# Patient Record
Sex: FEMALE | Race: OTHER | Employment: FULL TIME | URBAN - METROPOLITAN AREA
[De-identification: names, ages, dates, MRNs, and addresses within clinical notes are randomized per-mention and may not be internally consistent; named-entity substitution may affect disease eponyms.]

---

## 2017-05-12 ENCOUNTER — HOSPITAL ENCOUNTER (OUTPATIENT)
Dept: CT IMAGING | Facility: HOSPITAL | Age: 32
Discharge: HOME | End: 2017-05-12
Attending: INTERNAL MEDICINE

## 2017-05-12 DIAGNOSIS — Z13.89 ENCOUNTER FOR SCREENING FOR OTHER DISORDER: ICD-10-CM

## 2017-06-20 ENCOUNTER — TELEPHONE (OUTPATIENT)
Dept: FAMILY MEDICINE CLINIC | Facility: CLINIC | Age: 32
End: 2017-06-20

## 2017-06-20 NOTE — TELEPHONE ENCOUNTER
Called patient and spoke with her. Advised her of recent ultra fast heart scan results. Patient states understanding. Patient states that she also needs a physcial form completed for her work.   Advised her that we would have to see her for the form to b

## 2017-10-10 PROBLEM — Z20.6 HIV EXPOSURE: Status: ACTIVE | Noted: 2017-10-10

## 2017-10-12 ENCOUNTER — LAB ENCOUNTER (OUTPATIENT)
Dept: LAB | Facility: HOSPITAL | Age: 32
End: 2017-10-12
Attending: INTERNAL MEDICINE
Payer: COMMERCIAL

## 2017-10-12 DIAGNOSIS — Z20.6 HIV EXPOSURE: ICD-10-CM

## 2017-10-12 PROCEDURE — 36415 COLL VENOUS BLD VENIPUNCTURE: CPT

## 2017-10-12 PROCEDURE — 85025 COMPLETE CBC W/AUTO DIFF WBC: CPT

## 2017-10-12 PROCEDURE — 80053 COMPREHEN METABOLIC PANEL: CPT

## 2017-10-12 PROCEDURE — 87389 HIV-1 AG W/HIV-1&-2 AB AG IA: CPT

## 2018-03-26 PROBLEM — M84.362A STRESS FRACTURE OF LEFT TIBIA, INITIAL ENCOUNTER: Status: ACTIVE | Noted: 2018-03-26

## 2018-03-28 ENCOUNTER — HOSPITAL ENCOUNTER (OUTPATIENT)
Dept: MRI IMAGING | Facility: HOSPITAL | Age: 33
Discharge: HOME | End: 2018-03-28
Attending: ORTHOPAEDIC SURGERY
Payer: COMMERCIAL

## 2018-03-28 DIAGNOSIS — M79.662 PAIN IN LEFT SHIN: ICD-10-CM

## 2018-03-28 DIAGNOSIS — M84.362A STRESS FRACTURE OF LEFT TIBIA, INITIAL ENCOUNTER: ICD-10-CM

## 2018-03-28 PROCEDURE — 73721 MRI JNT OF LWR EXTRE W/O DYE: CPT | Performed by: ORTHOPAEDIC SURGERY

## 2018-06-09 ENCOUNTER — OFFICE VISIT (OUTPATIENT)
Dept: FAMILY MEDICINE CLINIC | Facility: CLINIC | Age: 33
End: 2018-06-09

## 2018-06-09 VITALS
HEART RATE: 87 BPM | HEIGHT: 63 IN | WEIGHT: 150 LBS | RESPIRATION RATE: 16 BRPM | OXYGEN SATURATION: 98 % | BODY MASS INDEX: 26.58 KG/M2 | SYSTOLIC BLOOD PRESSURE: 110 MMHG | DIASTOLIC BLOOD PRESSURE: 60 MMHG | TEMPERATURE: 98 F

## 2018-06-09 DIAGNOSIS — J40 BRONCHITIS: ICD-10-CM

## 2018-06-09 DIAGNOSIS — J01.40 ACUTE NON-RECURRENT PANSINUSITIS: Primary | ICD-10-CM

## 2018-06-09 PROCEDURE — 99203 OFFICE O/P NEW LOW 30 MIN: CPT | Performed by: NURSE PRACTITIONER

## 2018-06-09 RX ORDER — AMOXICILLIN AND CLAVULANATE POTASSIUM 875; 125 MG/1; MG/1
1 TABLET, FILM COATED ORAL 2 TIMES DAILY
Qty: 20 TABLET | Refills: 0 | Status: SHIPPED | OUTPATIENT
Start: 2018-06-09 | End: 2018-06-19

## 2018-06-09 RX ORDER — BENZONATATE 200 MG/1
200 CAPSULE ORAL 3 TIMES DAILY PRN
Qty: 30 CAPSULE | Refills: 0 | Status: SHIPPED | OUTPATIENT
Start: 2018-06-09 | End: 2021-03-02 | Stop reason: ALTCHOICE

## 2018-06-09 RX ORDER — ALBUTEROL SULFATE 90 UG/1
2 AEROSOL, METERED RESPIRATORY (INHALATION) EVERY 6 HOURS PRN
Qty: 1 INHALER | Refills: 0 | Status: SHIPPED | OUTPATIENT
Start: 2018-06-09 | End: 2021-03-02 | Stop reason: ALTCHOICE

## 2018-06-09 NOTE — PROGRESS NOTES
CHIEF COMPLAINT:   Patient presents with:  Cough: x11 days,   Nasal Congestion      HPI:   Heron Caldera is a 29year old female who presents for cold symptoms for  11  days.  Symptoms have progressed into sinus congestion and been worsening since on GENERAL:  reports  diminished appetite  SKIN: no rashes or abnormal skin lesions  HEENT: See HPI.     LUNGS: denies shortness of breath or wheezing, See HPI  CARDIOVASCULAR: denies chest pain or palpitations   GI: denies N/V/C or abdominal pain  NEURO: + si Sig: Take 1 capsule (200 mg total) by mouth 3 (three) times daily as needed for cough. Amoxicillin-Pot Clavulanate 875-125 MG Oral Tab 20 tablet 0      Sig: Take 1 tablet by mouth 2 (two) times daily.            Risks, benefits, side effects of medi · You may use over-the-counter medicine to control fever or pain, unless another pain medicine was prescribed. If you have chronic liver or kidney disease or have ever had a stomach ulcer or gastrointestinal bleeding, talk with your healthcare provider bef © 7931-1682 [address]. 140[address] [facility], [facility] [place]. All rights reserved. This information is not intended as a substitute for professional medical care. Always follow your healthcare professional's instructions.         Sinusit · Over-the-counter decongestants may be used unless a similar medicine was prescribed. Nasal sprays work the fastest. Use one that contains phenylephrine or oxymetazoline. First blow the nose gently. Then use the spray.  Do not use these medicines more ofte © 8208-4456 [employer] [address]. 1407 [facility], [address]. All rights reserved. This information is not intended as a substitute for professional medical care. Always follow your healthcare professional's instructions.         Causes People with chronic nasal problems or allergies are more likely to get acute sinusitis. Sinusitis is also more common if you have a weakened immune system, such as with HIV.  You are also more likely to get sinusitis if you have cystic fibrosis or another c Your doctor may prescribe medications to help treat your sinusitis. If you have an infection, antibiotics can help clear it up. If you are prescribed antibiotics, take all pills on schedule until they are gone, even if you feel better.  Decongestants help r

## 2018-06-09 NOTE — PATIENT INSTRUCTIONS
Viral Bronchitis (Adult)    You have a viral bronchitis. Bronchitis is inflammation and swelling of the lining of the lungs. This is often caused by an infection. Symptoms include a dry, hacking cough that is worse at night.  The cough may bring up yellow · Over-the-counter cough, cold, and sore-throat medicines will not shorten the length of the illness, but they may help to reduce symptoms. Don't use decongestants if you have high blood pressure.   Follow-up care  Follow up with your healthcare provider, o The sinuses are air-filled spaces within the bones of the face. They connect to the inside of the nose. Sinusitis is an inflammation of the tissue lining the sinus cavity. Sinus inflammation can occur during a cold.  It can also be due to allergies to polle · Do not use nasal rinses or irrigation during an acute sinus infection, unless told to by your health care provider. Rinsing may spread the infection to other sinuses.   · Use acetaminophen or ibuprofen to control pain, unless another pain medicine was pre Colds and other infections  A cold or flu may cause your sinus and nasal linings to swell. Sinus openings can become blocked. This causes mucus to back up. This backed-up mucus becomes an ideal place for bacteria to grow.  Thick, yellow, or discolored mucus Drink fluids  Drinking extra fluids helps thin your mucus. This lets it drain from your sinuses more easily. Have a glass of water every hour or two. A humidifier helps in much the same way. Fluids can also offset the drying effects of certain medicines.  I

## 2018-09-14 ENCOUNTER — OFFICE VISIT (OUTPATIENT)
Dept: FAMILY MEDICINE CLINIC | Facility: CLINIC | Age: 33
End: 2018-09-14
Payer: COMMERCIAL

## 2018-09-14 VITALS
OXYGEN SATURATION: 99 % | DIASTOLIC BLOOD PRESSURE: 68 MMHG | HEIGHT: 62 IN | BODY MASS INDEX: 27.42 KG/M2 | RESPIRATION RATE: 16 BRPM | TEMPERATURE: 99 F | SYSTOLIC BLOOD PRESSURE: 108 MMHG | HEART RATE: 100 BPM | WEIGHT: 149 LBS

## 2018-09-14 DIAGNOSIS — Z51.81 THERAPEUTIC DRUG MONITORING: Primary | ICD-10-CM

## 2018-09-14 DIAGNOSIS — Z23 NEED FOR INFLUENZA VACCINATION: ICD-10-CM

## 2018-09-14 DIAGNOSIS — B35.9 TINEA: ICD-10-CM

## 2018-09-14 DIAGNOSIS — B35.1 ONYCHOMYCOSIS: ICD-10-CM

## 2018-09-14 DIAGNOSIS — B35.1 ONYCHOMYCOSIS: Primary | ICD-10-CM

## 2018-09-14 PROCEDURE — 90471 IMMUNIZATION ADMIN: CPT | Performed by: FAMILY MEDICINE

## 2018-09-14 PROCEDURE — 99213 OFFICE O/P EST LOW 20 MIN: CPT | Performed by: FAMILY MEDICINE

## 2018-09-14 PROCEDURE — 90686 IIV4 VACC NO PRSV 0.5 ML IM: CPT | Performed by: FAMILY MEDICINE

## 2018-09-14 RX ORDER — TERBINAFINE HYDROCHLORIDE 250 MG/1
250 TABLET ORAL DAILY
Status: SHIPPED | OUTPATIENT
Start: 2018-09-14 | End: 2018-11-13

## 2018-09-14 RX ORDER — CLOTRIMAZOLE AND BETAMETHASONE DIPROPIONATE 10; .64 MG/G; MG/G
CREAM TOPICAL
Qty: 60 G | Refills: 3 | Status: SHIPPED | OUTPATIENT
Start: 2018-09-14 | End: 2021-03-02 | Stop reason: ALTCHOICE

## 2018-09-14 RX ORDER — TERBINAFINE HYDROCHLORIDE 250 MG/1
250 TABLET ORAL DAILY
Qty: 42 TABLET | Refills: 0 | Status: SHIPPED | OUTPATIENT
Start: 2018-09-14 | End: 2018-09-14

## 2018-09-14 RX ORDER — CLOTRIMAZOLE AND BETAMETHASONE DIPROPIONATE 10; .64 MG/G; MG/G
CREAM TOPICAL
Qty: 60 G | Refills: 3 | Status: SHIPPED | OUTPATIENT
Start: 2018-09-14 | End: 2018-09-14

## 2018-09-14 RX ORDER — TERBINAFINE HYDROCHLORIDE 250 MG/1
250 TABLET ORAL DAILY
Qty: 42 TABLET | Refills: 0 | Status: SHIPPED | OUTPATIENT
Start: 2018-09-14 | End: 2018-09-17

## 2018-09-14 NOTE — TELEPHONE ENCOUNTER
Patient's insurance will not cover terbinafine, please advise on alternative. Thanks. KATHLEEN left for patient advising her that doctor is out of the office. Will follow up with her on Monday regarding alternative prescription.

## 2018-09-14 NOTE — TELEPHONE ENCOUNTER
Pt is called, stating that she was seen today at 1:40 and prescribed medication. Terbinafine HCl (LAMISIL) tab 250 mg   Pt states that she is at the pharmacy at the moment and the pharmacy is telling her insurance will not cover it.  Pt said she is waiting

## 2018-09-14 NOTE — PROGRESS NOTES
[facility] Family Medicine Office Note  Chief Complaint:   Patient presents with:  Fungus Nails: x3 weeks  Other: exposed to ring worm, spot on RT hip      HPI:   This is a 29year old female coming in for  HPI  71-year-old female who presents to benzonatate 200 MG Oral Cap Take 1 capsule (200 mg total) by mouth 3 (three) times daily as needed for cough.  Disp: 30 capsule Rfl: 0   TRUVADA 200-300 MG Oral Tab  Disp:  Rfl:       Counseling given: Not Answered       REVIEW OF SYSTEMS:   Review of Syste Displacement of lumbar intervertebral disc without myelopathy     Pregnancy     Sx.7/10/15, CPT.94044, Right 5th Toe Closed Poss ORIF, w/, Global Exp.10/8/15     HIV exposure     Stress fracture of left tibia, initial encounter

## 2018-09-17 RX ORDER — FLUCONAZOLE 150 MG/1
TABLET ORAL
Qty: 12 TABLET | Refills: 0 | Status: SHIPPED | OUTPATIENT
Start: 2018-09-17 | End: 2019-03-05

## 2018-09-17 NOTE — TELEPHONE ENCOUNTER
Eric De La O MD   You 9 minutes ago (9:39 AM)      Please call in diflucan 150 mg P.O. once weekly x 3 months. Check cmp 6 weeks after starting medication.  (Routing comment)

## 2018-11-07 ENCOUNTER — LAB ENCOUNTER (OUTPATIENT)
Dept: LAB | Facility: HOSPITAL | Age: 33
End: 2018-11-07
Attending: FAMILY MEDICINE
Payer: COMMERCIAL

## 2018-11-07 DIAGNOSIS — B35.1 ONYCHOMYCOSIS: ICD-10-CM

## 2018-11-07 DIAGNOSIS — Z09 CHEMOTHERAPY FOLLOW-UP EXAMINATION: Primary | ICD-10-CM

## 2018-11-07 PROCEDURE — 80053 COMPREHEN METABOLIC PANEL: CPT

## 2018-11-07 PROCEDURE — 36415 COLL VENOUS BLD VENIPUNCTURE: CPT

## 2018-11-07 PROCEDURE — 87389 HIV-1 AG W/HIV-1&-2 AB AG IA: CPT

## 2018-12-18 DIAGNOSIS — Z20.6 HIV EXPOSURE: Primary | ICD-10-CM

## 2019-03-06 RX ORDER — FLUCONAZOLE 150 MG/1
TABLET ORAL
Qty: 12 TABLET | Refills: 0 | Status: SHIPPED | OUTPATIENT
Start: 2019-03-06 | End: 2021-03-02 | Stop reason: ALTCHOICE

## 2019-03-06 NOTE — TELEPHONE ENCOUNTER
Patients nail fungus is back and patient would like a refill .   Medication(s) to Refill:   Requested Prescriptions     Pending Prescriptions Disp Refills   • fluconazole 150 MG Oral Tab 12 tablet 0     Sig: Take once a week for 3 months         Reason for

## 2019-07-30 ENCOUNTER — HOSPITAL ENCOUNTER (OUTPATIENT)
Dept: GENERAL RADIOLOGY | Facility: HOSPITAL | Age: 34
Discharge: HOME | End: 2019-07-30
Attending: INTERNAL MEDICINE
Payer: COMMERCIAL

## 2019-07-30 DIAGNOSIS — S92.253A: ICD-10-CM

## 2019-07-30 PROCEDURE — 73630 X-RAY EXAM OF FOOT: CPT | Performed by: INTERNAL MEDICINE

## 2020-02-18 ENCOUNTER — OFFICE VISIT (OUTPATIENT)
Dept: FAMILY MEDICINE CLINIC | Facility: CLINIC | Age: 35
End: 2020-02-18
Payer: COMMERCIAL

## 2020-02-18 VITALS
OXYGEN SATURATION: 100 % | BODY MASS INDEX: 27.23 KG/M2 | WEIGHT: 148 LBS | HEIGHT: 62 IN | RESPIRATION RATE: 19 BRPM | HEART RATE: 68 BPM | TEMPERATURE: 99 F | DIASTOLIC BLOOD PRESSURE: 68 MMHG | SYSTOLIC BLOOD PRESSURE: 114 MMHG

## 2020-02-18 DIAGNOSIS — Z01.419 WELL WOMAN EXAM WITH ROUTINE GYNECOLOGICAL EXAM: Primary | ICD-10-CM

## 2020-02-18 DIAGNOSIS — Z12.4 SCREENING FOR MALIGNANT NEOPLASM OF CERVIX: ICD-10-CM

## 2020-02-18 DIAGNOSIS — Z00.00 ROUTINE GENERAL MEDICAL EXAMINATION AT A HEALTH CARE FACILITY: ICD-10-CM

## 2020-02-18 DIAGNOSIS — Z20.6 HIV EXPOSURE: ICD-10-CM

## 2020-02-18 PROCEDURE — 99395 PREV VISIT EST AGE 18-39: CPT | Performed by: FAMILY MEDICINE

## 2020-02-18 PROCEDURE — 88175 CYTOPATH C/V AUTO FLUID REDO: CPT | Performed by: FAMILY MEDICINE

## 2020-02-18 PROCEDURE — 87624 HPV HI-RISK TYP POOLED RSLT: CPT | Performed by: FAMILY MEDICINE

## 2020-02-18 RX ORDER — EMTRICITABINE AND TENOFOVIR DISOPROXIL FUMARATE 200; 300 MG/1; MG/1
1 TABLET, FILM COATED ORAL DAILY
Qty: 90 TABLET | Refills: 1 | Status: SHIPPED | OUTPATIENT
Start: 2020-02-18 | End: 2020-02-18

## 2020-02-18 RX ORDER — EMTRICITABINE AND TENOFOVIR DISOPROXIL FUMARATE 200; 300 MG/1; MG/1
1 TABLET, FILM COATED ORAL DAILY
Qty: 90 TABLET | Refills: 1 | Status: SHIPPED | OUTPATIENT
Start: 2020-02-18 | End: 2020-06-08

## 2020-02-18 NOTE — PATIENT INSTRUCTIONS
Dr. Francisco Rivera     Dermatology    [facility] [address]  [facility]    Phone: [phone]

## 2020-02-18 NOTE — PROGRESS NOTES
Wendel Harada is a 36year old female who presents for a complete physical exam.   HPI:     Patient presents with:  Physical      Patient feels well, dental visit up to date, no hearing problem. Vaccinations up to date.   , 2 babies  Perio Tobacco Use      Smoking status: Never Smoker      Smokeless tobacco: Never Used    Alcohol use: No    Drug use: No       REVIEW OF SYSTEMS:   GENERAL HEALTH: feels well, no fatigue.   SKIN: denies any unusual skin lesions or rashes  EYES: no visual complai tenderness. Rectal exam: has normal tone, no masses. Neuro: Cranial nerves II-XII normal,no focal abnormalities, and reflexes coordination and gait normal and symmetric. Sensation intact. Extremities: are symmetric with no cyanosis, clubbing, or edema.

## 2020-02-19 LAB — HPV I/H RISK 1 DNA SPEC QL NAA+PROBE: NEGATIVE

## 2020-06-05 NOTE — TELEPHONE ENCOUNTER
Medication(s) to Refill:   Requested Prescriptions     Pending Prescriptions Disp Refills   • TRUVADA 200-300 MG Oral Tab 90 tablet 1     Sig: Take 1 tablet by mouth daily.          Reason for Medication Refill being sent to Provider / Reason Protocol Faile

## 2020-06-08 RX ORDER — EMTRICITABINE AND TENOFOVIR DISOPROXIL FUMARATE 200; 300 MG/1; MG/1
1 TABLET, FILM COATED ORAL DAILY
Qty: 90 TABLET | Refills: 1 | Status: SHIPPED | OUTPATIENT
Start: 2020-06-08 | End: 2020-08-12

## 2020-08-13 RX ORDER — DEXAMETHASONE 0.75 MG/1
1 TABLET ORAL DAILY
Qty: 90 TABLET | Refills: 1 | Status: SHIPPED | OUTPATIENT
Start: 2020-08-13 | End: 2021-01-29

## 2020-12-17 ENCOUNTER — IMMUNIZATION (OUTPATIENT)
Dept: LAB | Facility: HOSPITAL | Age: 35
End: 2020-12-17
Attending: PREVENTIVE MEDICINE
Payer: COMMERCIAL

## 2020-12-17 DIAGNOSIS — Z23 NEED FOR VACCINATION: ICD-10-CM

## 2020-12-17 PROCEDURE — 0001A PFIZER-BIONTECH COVID-19 VACCINE: CPT

## 2021-01-07 ENCOUNTER — IMMUNIZATION (OUTPATIENT)
Dept: LAB | Facility: HOSPITAL | Age: 36
End: 2021-01-07
Attending: PREVENTIVE MEDICINE

## 2021-01-07 DIAGNOSIS — Z23 NEED FOR VACCINATION: ICD-10-CM

## 2021-01-07 PROCEDURE — 0002A SARSCOV2 VAC 30MCG/0.3ML IM: CPT

## 2021-01-29 RX ORDER — EMTRICITABINE AND TENOFOVIR DISOPROXIL FUMARATE 200; 300 MG/1; MG/1
1 TABLET, FILM COATED ORAL DAILY
Qty: 90 TABLET | Refills: 1 | Status: SHIPPED | OUTPATIENT
Start: 2021-01-29 | End: 2021-08-09

## 2021-03-02 ENCOUNTER — OFFICE VISIT (OUTPATIENT)
Dept: FAMILY MEDICINE CLINIC | Facility: CLINIC | Age: 36
End: 2021-03-02
Payer: COMMERCIAL

## 2021-03-02 VITALS
BODY MASS INDEX: 28.71 KG/M2 | HEIGHT: 62 IN | RESPIRATION RATE: 16 BRPM | DIASTOLIC BLOOD PRESSURE: 84 MMHG | SYSTOLIC BLOOD PRESSURE: 120 MMHG | OXYGEN SATURATION: 98 % | WEIGHT: 156 LBS | TEMPERATURE: 97 F | HEART RATE: 91 BPM

## 2021-03-02 DIAGNOSIS — Z13.21 SCREENING FOR ENDOCRINE, NUTRITIONAL, METABOLIC AND IMMUNITY DISORDER: ICD-10-CM

## 2021-03-02 DIAGNOSIS — Z01.419 WELL WOMAN EXAM WITH ROUTINE GYNECOLOGICAL EXAM: Primary | ICD-10-CM

## 2021-03-02 DIAGNOSIS — Z13.29 SCREENING FOR ENDOCRINE, NUTRITIONAL, METABOLIC AND IMMUNITY DISORDER: ICD-10-CM

## 2021-03-02 DIAGNOSIS — Z12.4 SCREENING FOR MALIGNANT NEOPLASM OF CERVIX: ICD-10-CM

## 2021-03-02 DIAGNOSIS — Z13.228 SCREENING FOR ENDOCRINE, NUTRITIONAL, METABOLIC AND IMMUNITY DISORDER: ICD-10-CM

## 2021-03-02 DIAGNOSIS — Z13.0 SCREENING FOR ENDOCRINE, NUTRITIONAL, METABOLIC AND IMMUNITY DISORDER: ICD-10-CM

## 2021-03-02 PROCEDURE — 88175 CYTOPATH C/V AUTO FLUID REDO: CPT | Performed by: FAMILY MEDICINE

## 2021-03-02 PROCEDURE — 87624 HPV HI-RISK TYP POOLED RSLT: CPT | Performed by: FAMILY MEDICINE

## 2021-03-02 PROCEDURE — 3074F SYST BP LT 130 MM HG: CPT | Performed by: FAMILY MEDICINE

## 2021-03-02 PROCEDURE — 3079F DIAST BP 80-89 MM HG: CPT | Performed by: FAMILY MEDICINE

## 2021-03-02 PROCEDURE — 99395 PREV VISIT EST AGE 18-39: CPT | Performed by: FAMILY MEDICINE

## 2021-03-02 PROCEDURE — 3008F BODY MASS INDEX DOCD: CPT | Performed by: FAMILY MEDICINE

## 2021-03-02 RX ORDER — CLOBETASOL PROPIONATE 0.46 MG/ML
1 SOLUTION TOPICAL 2 TIMES DAILY
Qty: 60 ML | Refills: 1 | Status: SHIPPED | OUTPATIENT
Start: 2021-03-02

## 2021-03-02 RX ORDER — BUSPIRONE HYDROCHLORIDE 5 MG/1
5 TABLET ORAL 3 TIMES DAILY
Qty: 270 TABLET | Refills: 3 | Status: SHIPPED | OUTPATIENT
Start: 2021-03-02 | End: 2021-08-09

## 2021-03-02 RX ORDER — BUSPIRONE HYDROCHLORIDE 5 MG/1
5 TABLET ORAL 3 TIMES DAILY
COMMUNITY
Start: 2020-12-09 | End: 2021-03-02

## 2021-03-02 NOTE — PROGRESS NOTES
Cathleen Ahumada is a 30year old female who presents for a complete physical exam.   HPI:     Patient presents with:  Physical: with pap       Patient feels well, dental visit up to date, no hearing problem. Vaccinations up to date.   , 2 ba denies chest pain, SOB, edema,orthopnea, no palpitations   GI: denies nausea, vomiting, constipation, diarrhea; no rectal bleeding; no heartburn  GENITAL/: no dysuria, urgency or frequency  MUSCULOSKELETAL: no joint complaints upper or lower extremities mood.          ASSESSMENT AND PLAN:     Maury Kam is a 30year old female who presents for a complete physical exam.Gyn exam. Pap smear done. Pt's was recommended low fat diet and aerobic exercise 30 minutes three times weekly.    Mole: referral

## 2021-03-02 NOTE — PATIENT INSTRUCTIONS
Dr. Kan Roldan [address], [address]        Dr. Pravin Ruano      Dermatology:    Phone: [phone]  Fax: 1809 [facility] 157 [facility], [address]      [address]  [facility]

## 2021-03-03 LAB — HPV I/H RISK 1 DNA SPEC QL NAA+PROBE: NEGATIVE

## 2021-03-04 ENCOUNTER — TELEPHONE (OUTPATIENT)
Dept: FAMILY MEDICINE CLINIC | Facility: CLINIC | Age: 36
End: 2021-03-04

## 2021-08-08 ENCOUNTER — PATIENT MESSAGE (OUTPATIENT)
Dept: FAMILY MEDICINE CLINIC | Facility: CLINIC | Age: 36
End: 2021-08-08

## 2021-08-09 RX ORDER — BUSPIRONE HYDROCHLORIDE 5 MG/1
5 TABLET ORAL 3 TIMES DAILY
Qty: 270 TABLET | Refills: 3 | Status: SHIPPED | OUTPATIENT
Start: 2021-08-09

## 2021-08-09 RX ORDER — LEVONORGESTREL AND ETHINYL ESTRADIOL 0.1-0.02MG
1 KIT ORAL DAILY
Qty: 3 EACH | Refills: 4 | Status: SHIPPED | OUTPATIENT
Start: 2021-08-09 | End: 2022-08-09

## 2021-08-09 NOTE — TELEPHONE ENCOUNTER
Patient requesting order for birth control. Last pap smear on 3/2/2021. Does patient need OV for order? Please advise. Thank you.

## 2021-08-09 NOTE — TELEPHONE ENCOUNTER
Medication(s) to Refill:   Requested Prescriptions     Pending Prescriptions Disp Refills   • busPIRone 5 MG Oral Tab 270 tablet 3     Sig: Take 1 tablet (5 mg total) by mouth 3 (three) times daily.          Reason for Medication Refill being sent to [facility]

## 2021-08-09 NOTE — TELEPHONE ENCOUNTER
From: Maury Kam  To: Quita Yip MD  Sent: 8/8/2021 4:21 PM CDT  Subject: Prescription Question    Can I please have a prescription for birth control pills? My pharmacy has changed to [facility] [phone].  I will try to update it in my chart

## 2021-08-10 RX ORDER — EMTRICITABINE AND TENOFOVIR DISOPROXIL FUMARATE 200; 300 MG/1; MG/1
1 TABLET, FILM COATED ORAL DAILY
Qty: 90 TABLET | Refills: 1 | Status: SHIPPED | OUTPATIENT
Start: 2021-08-10 | End: 2021-08-11

## 2021-08-11 ENCOUNTER — TELEPHONE (OUTPATIENT)
Dept: FAMILY MEDICINE CLINIC | Facility: CLINIC | Age: 36
End: 2021-08-11

## 2021-08-11 RX ORDER — EMTRICITABINE AND TENOFOVIR DISOPROXIL FUMARATE 200; 300 MG/1; MG/1
1 TABLET, FILM COATED ORAL DAILY
Qty: 90 TABLET | Refills: 3 | Status: SHIPPED | OUTPATIENT
Start: 2021-08-11

## 2021-08-11 NOTE — TELEPHONE ENCOUNTER
Otto Jo with [facility] called, brand name Truvada requires prior authorization, the generic is preferred and does not.

## 2021-08-11 NOTE — TELEPHONE ENCOUNTER
[facility] called that Truvada requires a PA however, generic is covered. Rx pended for generic. Please approve or deny pending Rx. Thank you!

## 2022-02-25 ENCOUNTER — PATIENT MESSAGE (OUTPATIENT)
Dept: FAMILY MEDICINE CLINIC | Facility: CLINIC | Age: 37
End: 2022-02-25

## 2022-02-25 NOTE — TELEPHONE ENCOUNTER
From: Deidre Herr  Sent: 2/25/2022 8:53 AM CST  To: [facility] Clinical Staff  Subject: Lab work    Thank you so much. One more thing for my job I need a chicken pox titer drawn. Is there any way that lab could be added? I apologize for not stating that earlier it slipped my mind.

## 2022-02-25 NOTE — TELEPHONE ENCOUNTER
Lillian Dsouza MD  [facility] Staff; Any Loya RN 28 minutes ago (11:54 AM)     LP    ok    Message text

## 2022-02-25 NOTE — TELEPHONE ENCOUNTER
Pt is coming in for annual labs & is requesting chicken pox titer order, her work is requesting she get one done. LOV 3/2/21 OK to order this?

## 2022-02-25 NOTE — TELEPHONE ENCOUNTER
From: Farnaz Mays  To: Blanca Thayer MD  Sent: 2/25/2022 7:18 AM CST  Subject: Lab work    Good morning I was going to go get the lab work that was ordered. I just wanted to verify that the lab orders were sent to [email or web address] in [place]. My insurance has changed and that is where I need to get my labs drawn now. Thank you so much.

## 2022-03-01 LAB
ABSOLUTE EOSINOPHILS: 48 CELLS/UL (ref 15–500)
ABSOLUTE LYMPHOCYTES: 1643 CELLS/UL (ref 850–3900)
ABSOLUTE MONOCYTES: 281 CELLS/UL (ref 200–950)
ABSOLUTE NEUTROPHILS: 3307 CELLS/UL (ref 1500–7800)
ALBUMIN/GLOBULIN RATIO: 1.8 (CALC) (ref 1–2.5)
ALKALINE PHOSPHATASE: 82 U/L (ref 31–125)
ALT: 50 U/L (ref 6–29)
AST: 21 U/L (ref 10–30)
BASOPHILS: 0.4 %
BILIRUBIN, TOTAL: 0.5 MG/DL (ref 0.2–1.2)
BUN: 13 MG/DL (ref 7–25)
CALCIUM: 9.6 MG/DL (ref 8.6–10.2)
CARBON DIOXIDE: 27 MMOL/L (ref 20–32)
CHLORIDE: 105 MMOL/L (ref 98–110)
CHOL/HDLC RATIO: 3.4 (CALC)
CHOLESTEROL, TOTAL: 182 MG/DL
CREATININE: 0.64 MG/DL (ref 0.5–1.1)
EGFR IF AFRICN AM: 134 ML/MIN/1.73M2
EGFR IF NONAFRICN AM: 116 ML/MIN/1.73M2
EOSINOPHILS: 0.9 %
GLOBULIN: 2.6 G/DL (CALC) (ref 1.9–3.7)
GLUCOSE: 87 MG/DL (ref 65–99)
HDL CHOLESTEROL: 53 MG/DL
HEMATOCRIT: 45.7 % (ref 35–45)
HEMOGLOBIN: 15 G/DL (ref 11.7–15.5)
LDL-CHOLESTEROL: 119 MG/DL (CALC)
MCH: 28.8 PG (ref 27–33)
MCHC: 32.8 G/DL (ref 32–36)
MCV: 87.7 FL (ref 80–100)
MONOCYTES: 5.3 %
MPV: 10 FL (ref 7.5–12.5)
NEUTROPHILS: 62.4 %
NON-HDL CHOLESTEROL: 129 MG/DL (CALC)
PLATELET COUNT: 306 THOUSAND/UL (ref 140–400)
PROTEIN, TOTAL: 7.2 G/DL (ref 6.1–8.1)
RDW: 12.3 % (ref 11–15)
RED BLOOD CELL COUNT: 5.21 MILLION/UL (ref 3.8–5.1)
SODIUM: 139 MMOL/L (ref 135–146)
TRIGLYCERIDES: 35 MG/DL
TSH W/REFLEX TO FT4: 1.02 MIU/L
WHITE BLOOD CELL COUNT: 5.3 THOUSAND/UL (ref 3.8–10.8)

## 2022-03-18 ENCOUNTER — OFFICE VISIT (OUTPATIENT)
Dept: FAMILY MEDICINE CLINIC | Facility: CLINIC | Age: 37
End: 2022-03-18
Payer: COMMERCIAL

## 2022-03-18 VITALS
HEART RATE: 90 BPM | WEIGHT: 154 LBS | BODY MASS INDEX: 28.34 KG/M2 | HEIGHT: 62 IN | OXYGEN SATURATION: 98 % | DIASTOLIC BLOOD PRESSURE: 80 MMHG | SYSTOLIC BLOOD PRESSURE: 120 MMHG | RESPIRATION RATE: 16 BRPM

## 2022-03-18 DIAGNOSIS — Z12.31 VISIT FOR SCREENING MAMMOGRAM: ICD-10-CM

## 2022-03-18 DIAGNOSIS — Z12.4 SCREENING FOR MALIGNANT NEOPLASM OF CERVIX: ICD-10-CM

## 2022-03-18 DIAGNOSIS — Z01.419 WELL WOMAN EXAM WITH ROUTINE GYNECOLOGICAL EXAM: Primary | ICD-10-CM

## 2022-03-18 PROCEDURE — 3074F SYST BP LT 130 MM HG: CPT | Performed by: FAMILY MEDICINE

## 2022-03-18 PROCEDURE — 88175 CYTOPATH C/V AUTO FLUID REDO: CPT | Performed by: FAMILY MEDICINE

## 2022-03-18 PROCEDURE — 87624 HPV HI-RISK TYP POOLED RSLT: CPT | Performed by: FAMILY MEDICINE

## 2022-03-18 PROCEDURE — 3008F BODY MASS INDEX DOCD: CPT | Performed by: FAMILY MEDICINE

## 2022-03-18 PROCEDURE — 99395 PREV VISIT EST AGE 18-39: CPT | Performed by: FAMILY MEDICINE

## 2022-03-18 PROCEDURE — 3079F DIAST BP 80-89 MM HG: CPT | Performed by: FAMILY MEDICINE

## 2022-03-18 RX ORDER — EMTRICITABINE AND TENOFOVIR DISOPROXIL FUMARATE 200; 300 MG/1; MG/1
1 TABLET, FILM COATED ORAL DAILY
Qty: 90 TABLET | Refills: 3 | Status: SHIPPED | OUTPATIENT
Start: 2022-03-18

## 2022-03-18 RX ORDER — LEVONORGESTREL AND ETHINYL ESTRADIOL 0.1-0.02MG
1 KIT ORAL DAILY
Qty: 3 EACH | Refills: 4 | Status: SHIPPED | OUTPATIENT
Start: 2022-03-18 | End: 2023-03-18

## 2022-03-21 LAB — HPV I/H RISK 1 DNA SPEC QL NAA+PROBE: NEGATIVE

## 2022-03-24 ENCOUNTER — HOSPITAL ENCOUNTER (OUTPATIENT)
Dept: MAMMOGRAPHY | Facility: HOSPITAL | Age: 37
Discharge: HOME | End: 2022-03-24
Attending: FAMILY MEDICINE
Payer: COMMERCIAL

## 2022-03-24 DIAGNOSIS — Z12.31 VISIT FOR SCREENING MAMMOGRAM: ICD-10-CM

## 2022-03-24 PROCEDURE — 77067 SCR MAMMO BI INCL CAD: CPT | Performed by: FAMILY MEDICINE

## 2022-03-24 PROCEDURE — 77063 BREAST TOMOSYNTHESIS BI: CPT | Performed by: FAMILY MEDICINE

## 2022-03-25 ENCOUNTER — TELEPHONE (OUTPATIENT)
Dept: FAMILY MEDICINE CLINIC | Facility: CLINIC | Age: 37
End: 2022-03-25

## 2022-03-25 NOTE — TELEPHONE ENCOUNTER
Notified the patient of the need for additional views. Patient verbalized understanding. Provided phone number for mammography department. Answered all questions at this time. Mammogram order placed.

## 2022-03-25 NOTE — TELEPHONE ENCOUNTER
----- Message from Amie Ortega MD sent at 3/25/2022  9:41 AM CDT -----  Needs additional views of the mammogram.    [facility] SANCHEZ 2D+3D SCREENING BILAT

## 2022-03-30 ENCOUNTER — PATIENT MESSAGE (OUTPATIENT)
Dept: FAMILY MEDICINE CLINIC | Facility: CLINIC | Age: 37
End: 2022-03-30

## 2022-03-30 ENCOUNTER — TELEPHONE (OUTPATIENT)
Dept: FAMILY MEDICINE CLINIC | Facility: CLINIC | Age: 37
End: 2022-03-30

## 2022-03-30 RX ORDER — BUSPIRONE HYDROCHLORIDE 5 MG/1
5 TABLET ORAL 3 TIMES DAILY
Qty: 270 TABLET | Refills: 3 | Status: SHIPPED | OUTPATIENT
Start: 2022-03-30

## 2022-03-30 NOTE — TELEPHONE ENCOUNTER
Pt sent message stating at last ov she states she did not want to take Buspirone but now pt would like to take medication. Please approve or deny pending Rx of  busPIRone HCl 5 MG Oral Tablet (BUSPAR) TID. Thank you.    LOV: 03/18/22  LF: 08/09/21

## 2022-03-30 NOTE — TELEPHONE ENCOUNTER
From: Nelda LO  To: Obed Jackson  Sent: 3/30/2022 3:42 PM CDT  Subject: PAP and HPV results     Debbie Saba,     Dr. Dennie Ratel has reviewed your PAP and HPV results and states you had normal results. If you have any questions please contact the office. Thank you and have a good day.

## 2022-04-01 ENCOUNTER — PATIENT MESSAGE (OUTPATIENT)
Dept: FAMILY MEDICINE CLINIC | Facility: CLINIC | Age: 37
End: 2022-04-01

## 2022-04-08 ENCOUNTER — HOSPITAL ENCOUNTER (OUTPATIENT)
Dept: ULTRASOUND IMAGING | Age: 37
Discharge: HOME | End: 2022-04-08
Attending: FAMILY MEDICINE
Payer: COMMERCIAL

## 2022-04-08 ENCOUNTER — HOSPITAL ENCOUNTER (OUTPATIENT)
Dept: MAMMOGRAPHY | Age: 37
Discharge: HOME | End: 2022-04-08
Attending: FAMILY MEDICINE
Payer: COMMERCIAL

## 2022-04-08 DIAGNOSIS — R92.8 ABNORMAL MAMMOGRAM OF LEFT BREAST: ICD-10-CM

## 2022-04-08 PROCEDURE — 77065 DX MAMMO INCL CAD UNI: CPT | Performed by: FAMILY MEDICINE

## 2022-04-08 PROCEDURE — 76642 ULTRASOUND BREAST LIMITED: CPT | Performed by: FAMILY MEDICINE

## 2022-04-08 PROCEDURE — 77061 BREAST TOMOSYNTHESIS UNI: CPT | Performed by: FAMILY MEDICINE

## 2022-04-11 ENCOUNTER — TELEPHONE (OUTPATIENT)
Dept: FAMILY MEDICINE CLINIC | Facility: CLINIC | Age: 37
End: 2022-04-11

## 2022-04-12 ENCOUNTER — TELEPHONE (OUTPATIENT)
Dept: FAMILY MEDICINE CLINIC | Facility: CLINIC | Age: 37
End: 2022-04-12

## 2022-07-26 ENCOUNTER — TELEPHONE (OUTPATIENT)
Dept: INTERNAL MEDICINE CLINIC | Facility: HOSPITAL | Age: 37
End: 2022-07-26

## 2022-07-26 NOTE — TELEPHONE ENCOUNTER
7/26 dashboard submission. Reported an exposure by patient. Asymptomatic and boosted. No testing required at this time. Called employee, no answer.  My chart message sent

## 2022-12-30 ENCOUNTER — TELEPHONE (OUTPATIENT)
Dept: FAMILY MEDICINE CLINIC | Facility: CLINIC | Age: 37
End: 2022-12-30

## 2022-12-30 DIAGNOSIS — Z00.00 ROUTINE GENERAL MEDICAL EXAMINATION AT A HEALTH CARE FACILITY: Primary | ICD-10-CM

## 2023-01-09 ENCOUNTER — PATIENT MESSAGE (OUTPATIENT)
Dept: FAMILY MEDICINE CLINIC | Facility: CLINIC | Age: 38
End: 2023-01-09

## 2023-01-09 RX ORDER — BUSPIRONE HYDROCHLORIDE 5 MG/1
5 TABLET ORAL 3 TIMES DAILY
Qty: 270 TABLET | Refills: 0 | Status: SHIPPED | OUTPATIENT
Start: 2023-01-09

## 2023-01-09 RX ORDER — LEVONORGESTREL AND ETHINYL ESTRADIOL 0.1-0.02MG
1 KIT ORAL DAILY
Qty: 3 EACH | Refills: 4 | Status: CANCELLED | OUTPATIENT
Start: 2023-01-09 | End: 2024-01-09

## 2023-01-09 RX ORDER — LEVONORGESTREL AND ETHINYL ESTRADIOL 0.1-0.02MG
1 KIT ORAL DAILY
Qty: 84 EACH | Refills: 0 | Status: SHIPPED | OUTPATIENT
Start: 2023-01-09 | End: 2024-01-09

## 2023-01-09 RX ORDER — EMTRICITABINE AND TENOFOVIR DISOPROXIL FUMARATE 200; 300 MG/1; MG/1
1 TABLET, FILM COATED ORAL DAILY
Qty: 90 TABLET | Refills: 0 | Status: SHIPPED | OUTPATIENT
Start: 2023-01-09

## 2023-01-09 RX ORDER — BUSPIRONE HYDROCHLORIDE 5 MG/1
5 TABLET ORAL 3 TIMES DAILY
Qty: 270 TABLET | Refills: 3 | Status: CANCELLED | OUTPATIENT
Start: 2023-01-09

## 2023-01-09 RX ORDER — EMTRICITABINE AND TENOFOVIR DISOPROXIL FUMARATE 200; 300 MG/1; MG/1
1 TABLET, FILM COATED ORAL DAILY
Qty: 90 TABLET | Refills: 3 | Status: CANCELLED | OUTPATIENT
Start: 2023-01-09

## 2023-01-09 NOTE — TELEPHONE ENCOUNTER
Truvada (to [facility]), OCP & Buspar (to [facility]) refill request.  LOV 3/18/22. Last HIV test 2016. Please approve or deny pending Rx.

## 2023-01-09 NOTE — TELEPHONE ENCOUNTER
From: Jae Cote  Sent: 1/9/2023 8:30 AM CST  To: [facility] Clinical Staff  Subject: Prescriptions     [facility] specialty pharmacy fax is [phone]. (For the generic Truvada)    [facility] regular pharmacy is [phone]. (For my Buspurone and birth control. Thank you for your time.

## 2023-04-10 ENCOUNTER — OFFICE VISIT (OUTPATIENT)
Dept: FAMILY MEDICINE CLINIC | Facility: CLINIC | Age: 38
End: 2023-04-10
Payer: COMMERCIAL

## 2023-04-10 ENCOUNTER — OFFICE VISIT (OUTPATIENT)
Dept: INTERNAL MEDICINE CLINIC | Facility: CLINIC | Age: 38
End: 2023-04-10

## 2023-04-10 ENCOUNTER — LAB ENCOUNTER (OUTPATIENT)
Dept: LAB | Age: 38
End: 2023-04-10
Attending: INTERNAL MEDICINE
Payer: COMMERCIAL

## 2023-04-10 VITALS
WEIGHT: 164 LBS | RESPIRATION RATE: 16 BRPM | BODY MASS INDEX: 30.18 KG/M2 | OXYGEN SATURATION: 98 % | HEART RATE: 88 BPM | SYSTOLIC BLOOD PRESSURE: 110 MMHG | HEIGHT: 62 IN | DIASTOLIC BLOOD PRESSURE: 84 MMHG

## 2023-04-10 VITALS
HEART RATE: 92 BPM | SYSTOLIC BLOOD PRESSURE: 114 MMHG | HEIGHT: 62.3 IN | DIASTOLIC BLOOD PRESSURE: 84 MMHG | WEIGHT: 164 LBS | BODY MASS INDEX: 29.8 KG/M2

## 2023-04-10 DIAGNOSIS — Z51.81 THERAPEUTIC DRUG MONITORING: ICD-10-CM

## 2023-04-10 DIAGNOSIS — Z00.00 ROUTINE GENERAL MEDICAL EXAMINATION AT A HEALTH CARE FACILITY: Primary | ICD-10-CM

## 2023-04-10 DIAGNOSIS — E78.5 HYPERLIPIDEMIA, UNSPECIFIED HYPERLIPIDEMIA TYPE: ICD-10-CM

## 2023-04-10 DIAGNOSIS — Z13.21 ENCOUNTER FOR VITAMIN DEFICIENCY SCREENING: ICD-10-CM

## 2023-04-10 DIAGNOSIS — E66.3 OVERWEIGHT (BMI 25.0-29.9): Primary | ICD-10-CM

## 2023-04-10 DIAGNOSIS — N89.8 VAGINAL IRRITATION: ICD-10-CM

## 2023-04-10 LAB
APPEARANCE: CLEAR
BILIRUBIN: NEGATIVE
GLUCOSE (URINE DIPSTICK): NEGATIVE MG/DL
KETONES (URINE DIPSTICK): NEGATIVE MG/DL
MULTISTIX LOT#: ABNORMAL NUMERIC
NITRITE, URINE: NEGATIVE
OCCULT BLOOD: NEGATIVE
PH, URINE: 5.5 (ref 4.5–8)
PROTEIN (URINE DIPSTICK): NEGATIVE MG/DL
SPECIFIC GRAVITY: 1.02 (ref 1–1.03)
URINE-COLOR: YELLOW
UROBILINOGEN,SEMI-QN: 0.2 MG/DL (ref 0–1.9)

## 2023-04-10 PROCEDURE — 93010 ELECTROCARDIOGRAM REPORT: CPT | Performed by: INTERNAL MEDICINE

## 2023-04-10 PROCEDURE — 93005 ELECTROCARDIOGRAM TRACING: CPT

## 2023-04-10 PROCEDURE — 87086 URINE CULTURE/COLONY COUNT: CPT | Performed by: NURSE PRACTITIONER

## 2023-04-10 RX ORDER — NYSTATIN 100000 U/G
1 CREAM TOPICAL 2 TIMES DAILY
Qty: 30 G | Refills: 1 | Status: SHIPPED | OUTPATIENT
Start: 2023-04-10 | End: 2023-04-10

## 2023-04-10 RX ORDER — METOPROLOL SUCCINATE 25 MG/1
TABLET, EXTENDED RELEASE ORAL
COMMUNITY
Start: 2023-01-09

## 2023-04-10 RX ORDER — NYSTATIN 100000 U/G
1 CREAM TOPICAL 2 TIMES DAILY
Qty: 30 G | Refills: 1 | Status: SHIPPED | OUTPATIENT
Start: 2023-04-10 | End: 2023-04-20

## 2023-04-10 RX ORDER — LEVONORGESTREL AND ETHINYL ESTRADIOL 0.1-0.02MG
1 KIT ORAL DAILY
Qty: 84 EACH | Refills: 3 | Status: SHIPPED | OUTPATIENT
Start: 2023-04-10 | End: 2024-04-09

## 2023-04-10 RX ORDER — EMTRICITABINE AND TENOFOVIR DISOPROXIL FUMARATE 200; 300 MG/1; MG/1
1 TABLET, FILM COATED ORAL DAILY
Qty: 90 TABLET | Refills: 1 | Status: SHIPPED | OUTPATIENT
Start: 2023-04-10

## 2023-04-10 RX ORDER — PHENTERMINE HYDROCHLORIDE 15 MG/1
15 CAPSULE ORAL EVERY MORNING
Qty: 30 CAPSULE | Refills: 0 | Status: SHIPPED | OUTPATIENT
Start: 2023-04-10

## 2023-04-11 LAB
ATRIAL RATE: 66 BPM
P AXIS: 37 DEGREES
P-R INTERVAL: 132 MS
Q-T INTERVAL: 400 MS
QRS DURATION: 90 MS
QTC CALCULATION (BEZET): 419 MS
R AXIS: 3 DEGREES
T AXIS: 26 DEGREES
VENTRICULAR RATE: 66 BPM

## 2023-04-16 ENCOUNTER — PATIENT MESSAGE (OUTPATIENT)
Dept: INTERNAL MEDICINE CLINIC | Facility: CLINIC | Age: 38
End: 2023-04-16

## 2023-04-19 NOTE — TELEPHONE ENCOUNTER
Dr. Sophie Yan to review and advise -- see letter in MyChart message and notes below    Also routing to Marva Spaulding PA-C as Dr. Sophie Yan is out of the office

## 2023-04-24 NOTE — TELEPHONE ENCOUNTER
Pt calling back and states was able to find Goodrx coupon for Phentermine and requests sent to [facility] in [place]. RN not authorized to send prescription. Pended for provider review.

## 2023-04-25 RX ORDER — PHENTERMINE HYDROCHLORIDE 15 MG/1
15 CAPSULE ORAL EVERY MORNING
Qty: 30 CAPSULE | Refills: 0 | Status: SHIPPED | OUTPATIENT
Start: 2023-04-25

## 2023-05-15 ENCOUNTER — OFFICE VISIT (OUTPATIENT)
Dept: INTERNAL MEDICINE CLINIC | Facility: CLINIC | Age: 38
End: 2023-05-15

## 2023-05-15 VITALS
SYSTOLIC BLOOD PRESSURE: 114 MMHG | WEIGHT: 156 LBS | OXYGEN SATURATION: 100 % | BODY MASS INDEX: 28.34 KG/M2 | HEART RATE: 98 BPM | HEIGHT: 62.3 IN | DIASTOLIC BLOOD PRESSURE: 92 MMHG

## 2023-05-15 DIAGNOSIS — E66.3 OVERWEIGHT (BMI 25.0-29.9): Primary | ICD-10-CM

## 2023-05-15 DIAGNOSIS — Z51.81 THERAPEUTIC DRUG MONITORING: ICD-10-CM

## 2023-05-15 DIAGNOSIS — E78.5 HYPERLIPIDEMIA, UNSPECIFIED HYPERLIPIDEMIA TYPE: ICD-10-CM

## 2023-05-15 DIAGNOSIS — R03.0 ELEVATED BP WITHOUT DIAGNOSIS OF HYPERTENSION: ICD-10-CM

## 2023-05-15 PROCEDURE — 3008F BODY MASS INDEX DOCD: CPT | Performed by: INTERNAL MEDICINE

## 2023-05-15 PROCEDURE — 3074F SYST BP LT 130 MM HG: CPT | Performed by: INTERNAL MEDICINE

## 2023-05-15 PROCEDURE — 3080F DIAST BP >= 90 MM HG: CPT | Performed by: INTERNAL MEDICINE

## 2023-05-15 PROCEDURE — 99214 OFFICE O/P EST MOD 30 MIN: CPT | Performed by: INTERNAL MEDICINE

## 2023-05-15 RX ORDER — TOPIRAMATE 25 MG/1
25 TABLET ORAL 2 TIMES DAILY
Qty: 60 TABLET | Refills: 0 | Status: SHIPPED | OUTPATIENT
Start: 2023-05-15 | End: 2023-06-14

## 2023-05-15 RX ORDER — PHENTERMINE HYDROCHLORIDE 15 MG/1
15 CAPSULE ORAL EVERY MORNING
Qty: 30 CAPSULE | Refills: 0 | Status: CANCELLED | OUTPATIENT
Start: 2023-05-15

## 2023-06-19 ENCOUNTER — OFFICE VISIT (OUTPATIENT)
Dept: INTERNAL MEDICINE CLINIC | Facility: CLINIC | Age: 38
End: 2023-06-19

## 2023-06-19 VITALS
SYSTOLIC BLOOD PRESSURE: 106 MMHG | HEART RATE: 105 BPM | BODY MASS INDEX: 27.58 KG/M2 | WEIGHT: 151.81 LBS | DIASTOLIC BLOOD PRESSURE: 71 MMHG | HEIGHT: 62.3 IN

## 2023-06-19 DIAGNOSIS — R03.0 ELEVATED BP WITHOUT DIAGNOSIS OF HYPERTENSION: ICD-10-CM

## 2023-06-19 DIAGNOSIS — E66.3 OVERWEIGHT (BMI 25.0-29.9): ICD-10-CM

## 2023-06-19 DIAGNOSIS — E78.5 HYPERLIPIDEMIA, UNSPECIFIED HYPERLIPIDEMIA TYPE: Primary | ICD-10-CM

## 2023-06-19 DIAGNOSIS — Z51.81 THERAPEUTIC DRUG MONITORING: ICD-10-CM

## 2023-06-19 PROCEDURE — 3078F DIAST BP <80 MM HG: CPT | Performed by: INTERNAL MEDICINE

## 2023-06-19 PROCEDURE — 99214 OFFICE O/P EST MOD 30 MIN: CPT | Performed by: INTERNAL MEDICINE

## 2023-06-19 PROCEDURE — 3008F BODY MASS INDEX DOCD: CPT | Performed by: INTERNAL MEDICINE

## 2023-06-19 PROCEDURE — 3074F SYST BP LT 130 MM HG: CPT | Performed by: INTERNAL MEDICINE

## 2023-06-19 RX ORDER — TOPIRAMATE 25 MG/1
25 TABLET ORAL 2 TIMES DAILY
Qty: 60 TABLET | Refills: 1 | Status: SHIPPED | OUTPATIENT
Start: 2023-06-19 | End: 2023-08-18

## 2023-06-19 RX ORDER — TOPIRAMATE 25 MG/1
25 TABLET ORAL 2 TIMES DAILY
COMMUNITY
End: 2023-06-19

## 2023-08-26 LAB
ABSOLUTE BASOPHILS: 22 CELLS/UL (ref 0–200)
ABSOLUTE EOSINOPHILS: 43 CELLS/UL (ref 15–500)
ABSOLUTE LYMPHOCYTES: 1523 CELLS/UL (ref 850–3900)
ABSOLUTE MONOCYTES: 497 CELLS/UL (ref 200–950)
ABSOLUTE NEUTROPHILS: 8716 CELLS/UL (ref 1500–7800)
ALBUMIN/GLOBULIN RATIO: 1.9 (CALC) (ref 1–2.5)
ALBUMIN: 4.9 G/DL (ref 3.6–5.1)
ALKALINE PHOSPHATASE: 65 U/L (ref 31–125)
ALT: 16 U/L (ref 6–29)
AST: 15 U/L (ref 10–30)
BASOPHILS: 0.2 %
BILIRUBIN, TOTAL: 0.5 MG/DL (ref 0.2–1.2)
BUN: 15 MG/DL (ref 7–25)
CALCIUM: 9.8 MG/DL (ref 8.6–10.2)
CARBON DIOXIDE: 25 MMOL/L (ref 20–32)
CHLORIDE: 105 MMOL/L (ref 98–110)
CHOL/HDLC RATIO: 3.3 (CALC)
CHOLESTEROL, TOTAL: 200 MG/DL
CREATININE: 0.85 MG/DL (ref 0.5–0.97)
EGFR: 90 ML/MIN/1.73M2
EOSINOPHILS: 0.4 %
GLOBULIN: 2.6 G/DL (CALC) (ref 1.9–3.7)
GLUCOSE: 78 MG/DL (ref 65–99)
HDL CHOLESTEROL: 61 MG/DL
HEMATOCRIT: 42.8 % (ref 35–45)
HEMOGLOBIN: 14.3 G/DL (ref 11.7–15.5)
LDL-CHOLESTEROL: 126 MG/DL (CALC)
LYMPHOCYTES: 14.1 %
MCH: 29.3 PG (ref 27–33)
MCHC: 33.4 G/DL (ref 32–36)
MCV: 87.7 FL (ref 80–100)
MONOCYTES: 4.6 %
MPV: 10.1 FL (ref 7.5–12.5)
NEUTROPHILS: 80.7 %
NON-HDL CHOLESTEROL: 139 MG/DL (CALC)
PLATELET COUNT: 328 THOUSAND/UL (ref 140–400)
POTASSIUM: 4.4 MMOL/L (ref 3.5–5.3)
PROTEIN, TOTAL: 7.5 G/DL (ref 6.1–8.1)
RDW: 12.5 % (ref 11–15)
RED BLOOD CELL COUNT: 4.88 MILLION/UL (ref 3.8–5.1)
SODIUM: 139 MMOL/L (ref 135–146)
TRIGLYCERIDES: 42 MG/DL
TSH W/REFLEX TO FT4: 1.11 MIU/L
VITAMIN D, 25-OH, TOTAL: 25 NG/ML (ref 30–100)
WHITE BLOOD CELL COUNT: 10.8 THOUSAND/UL (ref 3.8–10.8)

## 2023-08-28 RX ORDER — ERGOCALCIFEROL 1.25 MG/1
50000 CAPSULE ORAL WEEKLY
Qty: 12 CAPSULE | Refills: 0 | Status: SHIPPED | OUTPATIENT
Start: 2023-08-28 | End: 2023-11-20

## 2023-08-29 RX ORDER — DOXYCYCLINE HYCLATE 100 MG
100 TABLET ORAL 2 TIMES DAILY
COMMUNITY
Start: 2023-07-20 | End: 2023-08-31

## 2023-08-29 RX ORDER — NYSTATIN 100000 U/G
CREAM TOPICAL
COMMUNITY
Start: 2023-08-16

## 2023-08-31 ENCOUNTER — OFFICE VISIT (OUTPATIENT)
Dept: INTERNAL MEDICINE CLINIC | Facility: CLINIC | Age: 38
End: 2023-08-31

## 2023-08-31 VITALS
HEIGHT: 62.3 IN | WEIGHT: 153.63 LBS | BODY MASS INDEX: 27.92 KG/M2 | HEART RATE: 69 BPM | DIASTOLIC BLOOD PRESSURE: 79 MMHG | SYSTOLIC BLOOD PRESSURE: 112 MMHG

## 2023-08-31 DIAGNOSIS — E55.9 VITAMIN D DEFICIENCY: ICD-10-CM

## 2023-08-31 DIAGNOSIS — R03.0 ELEVATED BP WITHOUT DIAGNOSIS OF HYPERTENSION: ICD-10-CM

## 2023-08-31 DIAGNOSIS — E66.3 OVERWEIGHT (BMI 25.0-29.9): ICD-10-CM

## 2023-08-31 DIAGNOSIS — E78.2 MODERATE MIXED HYPERLIPIDEMIA NOT REQUIRING STATIN THERAPY: Primary | ICD-10-CM

## 2023-08-31 DIAGNOSIS — D72.829 LEUKOCYTOSIS, UNSPECIFIED TYPE: ICD-10-CM

## 2023-08-31 PROCEDURE — 99214 OFFICE O/P EST MOD 30 MIN: CPT | Performed by: INTERNAL MEDICINE

## 2023-08-31 PROCEDURE — 3074F SYST BP LT 130 MM HG: CPT | Performed by: INTERNAL MEDICINE

## 2023-08-31 PROCEDURE — 3078F DIAST BP <80 MM HG: CPT | Performed by: INTERNAL MEDICINE

## 2023-08-31 PROCEDURE — 3008F BODY MASS INDEX DOCD: CPT | Performed by: INTERNAL MEDICINE

## 2023-08-31 RX ORDER — DIETHYLPROPION HYDROCHLORIDE 25 MG/1
1 TABLET ORAL
Qty: 60 TABLET | Refills: 1 | Status: SHIPPED | OUTPATIENT
Start: 2023-08-31 | End: 2023-09-30

## 2023-09-18 LAB
ABSOLUTE BASOPHILS: 29 CELLS/UL (ref 0–200)
ABSOLUTE EOSINOPHILS: 58 CELLS/UL (ref 15–500)
ABSOLUTE LYMPHOCYTES: 1740 CELLS/UL (ref 850–3900)
ABSOLUTE MONOCYTES: 278 CELLS/UL (ref 200–950)
ABSOLUTE NEUTROPHILS: 3695 CELLS/UL (ref 1500–7800)
BASOPHILS: 0.5 %
EOSINOPHILS: 1 %
HEMATOCRIT: 42.9 % (ref 35–45)
HEMOGLOBIN: 14.6 G/DL (ref 11.7–15.5)
LYMPHOCYTES: 30 %
MCH: 29.5 PG (ref 27–33)
MCHC: 34 G/DL (ref 32–36)
MCV: 86.7 FL (ref 80–100)
MONOCYTES: 4.8 %
MPV: 10.1 FL (ref 7.5–12.5)
NEUTROPHILS: 63.7 %
PLATELET COUNT: 306 THOUSAND/UL (ref 140–400)
RDW: 12.7 % (ref 11–15)
RED BLOOD CELL COUNT: 4.95 MILLION/UL (ref 3.8–5.1)
WHITE BLOOD CELL COUNT: 5.8 THOUSAND/UL (ref 3.8–10.8)

## 2023-10-15 ENCOUNTER — PATIENT MESSAGE (OUTPATIENT)
Dept: FAMILY MEDICINE CLINIC | Facility: CLINIC | Age: 38
End: 2023-10-15

## 2023-10-15 DIAGNOSIS — Z01.84 IMMUNITY STATUS TESTING: Primary | ICD-10-CM

## 2023-10-16 NOTE — TELEPHONE ENCOUNTER
From: Dao Churchill  To: Harper Platt  Sent: 10/15/2023 1:30 PM CDT  Subject: Chicken pox declaration    Good afternoon, I need documentation of having chicken pox previously from my physician. 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 It states: Please provide a vaccination history that demonstrates vaccination against Varicella (Chicken Pox). For this credential, it is required that all results be clearly documented from a Medical Professional and include your full name, the Medical Professional's name, address and Examples include Medical Professional Letterhead, Prescription pads, Office/Clinician  \"stamp\", Laboratory record, etc. To satisfy this credential, we can accept any of the followin - - Clear documentation from a Medical  Professional indicating that you have a history of  Varicella (Chicken Pox). Can I please get documentation of this? I had chicken pox when I was 11years old.

## 2023-11-06 ENCOUNTER — OFFICE VISIT (OUTPATIENT)
Dept: INTERNAL MEDICINE CLINIC | Facility: CLINIC | Age: 38
End: 2023-11-06

## 2023-11-06 VITALS
BODY MASS INDEX: 28.53 KG/M2 | WEIGHT: 157 LBS | SYSTOLIC BLOOD PRESSURE: 130 MMHG | HEART RATE: 88 BPM | OXYGEN SATURATION: 100 % | HEIGHT: 62.3 IN | DIASTOLIC BLOOD PRESSURE: 80 MMHG

## 2023-11-06 DIAGNOSIS — E66.3 OVERWEIGHT (BMI 25.0-29.9): ICD-10-CM

## 2023-11-06 DIAGNOSIS — R03.0 ELEVATED BP WITHOUT DIAGNOSIS OF HYPERTENSION: ICD-10-CM

## 2023-11-06 DIAGNOSIS — E78.2 MODERATE MIXED HYPERLIPIDEMIA NOT REQUIRING STATIN THERAPY: Primary | ICD-10-CM

## 2023-11-06 PROCEDURE — 3008F BODY MASS INDEX DOCD: CPT | Performed by: INTERNAL MEDICINE

## 2023-11-06 PROCEDURE — 99214 OFFICE O/P EST MOD 30 MIN: CPT | Performed by: INTERNAL MEDICINE

## 2023-11-06 PROCEDURE — 3079F DIAST BP 80-89 MM HG: CPT | Performed by: INTERNAL MEDICINE

## 2023-11-06 PROCEDURE — 3075F SYST BP GE 130 - 139MM HG: CPT | Performed by: INTERNAL MEDICINE

## 2023-11-06 RX ORDER — DIETHYLPROPION HYDROCHLORIDE 25 MG/1
1 TABLET ORAL
Qty: 60 TABLET | Refills: 1 | Status: SHIPPED | OUTPATIENT
Start: 2023-11-06 | End: 2024-01-05

## 2023-11-06 RX ORDER — DIETHYLPROPION HYDROCHLORIDE 25 MG/1
1 TABLET ORAL
COMMUNITY
Start: 2023-10-09 | End: 2023-11-06

## 2023-11-06 RX ORDER — METFORMIN HYDROCHLORIDE 500 MG/1
1000 TABLET, EXTENDED RELEASE ORAL 2 TIMES DAILY WITH MEALS
Qty: 120 TABLET | Refills: 1 | Status: SHIPPED | OUTPATIENT
Start: 2023-11-06 | End: 2024-01-05

## 2024-01-05 RX ORDER — EMTRICITABINE AND TENOFOVIR DISOPROXIL FUMARATE 200; 300 MG/1; MG/1
1 TABLET, FILM COATED ORAL DAILY
Qty: 90 TABLET | Refills: 1 | Status: SHIPPED | OUTPATIENT
Start: 2024-01-05

## 2024-01-05 RX ORDER — LEVONORGESTREL AND ETHINYL ESTRADIOL 0.1-0.02MG
1 KIT ORAL DAILY
Qty: 84 EACH | Refills: 3 | Status: SHIPPED | OUTPATIENT
Start: 2024-01-05 | End: 2025-01-04

## 2024-01-05 NOTE — TELEPHONE ENCOUNTER
Medication(s) to Refill:   Requested Prescriptions     Pending Prescriptions Disp Refills    busPIRone 5 MG Oral Tab 270 tablet 0     Sig: Take 1 tablet (5 mg total) by mouth 3 (three) times daily.         Reason for Medication Refill being sent to Provider / Reason Protocol Failed:  [] 90 day refill has already been granted  [] Blood Pressure out of range  [] Labs Abnormal/over due  [] Medication not previously prescribed by Provider  [x] Non-Protocol Medication  [] Controlled Substance   [] Due for appointment- no future appointment scheduled  [] No Follow up specified      Last Time Medication was Filled:  1/9/2023      Last Office Visit with PCP: 4/10/2023 w/NP    When Patient was Due Back to the Office:  one year   (from when PCP last addressed condition)    Future Appointments:  Future Appointments   Date Time Provider Department Center   1/8/2024 12:00 PM [facility]   3/1/2024 12:00 PM Gunjan Collins MD [facility]         Last Blood Pressures:  BP Readings from Last 2 Encounters:   11/06/23 130/80   08/31/23 112/79       Action taken:  [] Refill approved per protocol  [x] Routing to provider for approval

## 2024-01-05 NOTE — TELEPHONE ENCOUNTER
Medication(s) to Refill:   Requested Prescriptions     Pending Prescriptions Disp Refills    Levonorgestrel-Ethinyl Estrad (AVIANE) 0.1-20 MG-MCG Oral Tab 84 each 3     Sig: Take 1 tablet by mouth daily.    emtricitabine-tenofovir (TRUVADA) 200-300 MG Oral Tab 90 tablet 1     Sig: Take 1 tablet by mouth daily.         Reason for Medication Refill being sent to Provider / Reason Protocol Failed:  [] 90 day refill has already been granted  [] Blood Pressure out of range  [] Labs Abnormal/over due  [] Medication not previously prescribed by Provider  [] Non-Protocol Medication  [] Controlled Substance   [] Due for appointment- no future appointment scheduled  [] No Follow up specified      Last Time Medication was Filled:  4/10/2023      Last Office Visit with PCP: 4/10/2023 with NP     When Patient was Due Back to the Office:  1 year   (from when PCP last addressed condition)    Future Appointments:  Future Appointments   Date Time Provider Department Center   1/8/2024 12:00 PM Erin Holder MD [facility]   3/1/2024 12:00 PM Gunjan Collins MD [facility] [place]         Last Blood Pressures:  BP Readings from Last 2 Encounters:   11/06/23 130/80   08/31/23 112/79               Action taken:  [] Refill approved per protocol  [x] Routing to provider for approval

## 2024-01-07 RX ORDER — BUSPIRONE HYDROCHLORIDE 5 MG/1
5 TABLET ORAL 3 TIMES DAILY
Qty: 270 TABLET | Refills: 0 | Status: SHIPPED | OUTPATIENT
Start: 2024-01-07

## 2024-02-08 ENCOUNTER — TELEMEDICINE (OUTPATIENT)
Dept: INTERNAL MEDICINE CLINIC | Facility: CLINIC | Age: 39
End: 2024-02-08
Payer: COMMERCIAL

## 2024-02-08 DIAGNOSIS — E66.3 OVERWEIGHT WITH BODY MASS INDEX (BMI) OF 29 TO 29.9 IN ADULT: Primary | ICD-10-CM

## 2024-02-08 DIAGNOSIS — E78.2 MODERATE MIXED HYPERLIPIDEMIA NOT REQUIRING STATIN THERAPY: ICD-10-CM

## 2024-02-08 DIAGNOSIS — I10 PRIMARY HYPERTENSION: ICD-10-CM

## 2024-02-08 PROCEDURE — 99214 OFFICE O/P EST MOD 30 MIN: CPT | Performed by: INTERNAL MEDICINE

## 2024-02-08 RX ORDER — TIRZEPATIDE 2.5 MG/.5ML
2.5 INJECTION, SOLUTION SUBCUTANEOUS WEEKLY
Qty: 6.5 ML | Refills: 0 | Status: SHIPPED | OUTPATIENT
Start: 2024-02-08 | End: 2024-02-09

## 2024-02-08 NOTE — PROGRESS NOTES
Patient ID: Tanisha Mack is a 38 year old female.  Chief Complaint   Patient presents with    Weight Loss          HISTORY OF PRESENT ILLNESS:   Patient presents for above.  This visit is conducted using Telemedicine with live, interactive video and audio.      Initial weight: 164 lbs 4/2023  Current weight: 156 lbs  Interval weight loss: 1 lbs  Total weight loss: 7 lbs    Review of Systems   Constitutional:  Negative for appetite change, fatigue and fever.   HENT:  Negative for drooling, ear pain, hearing loss and sore throat.    Eyes:  Negative for pain and redness.   Respiratory:  Negative for choking, shortness of breath, wheezing and stridor.    Cardiovascular:  Negative for chest pain and palpitations.   Gastrointestinal:  Negative for abdominal pain.   Endocrine: Negative for polydipsia and polyphagia.   Musculoskeletal:  Negative for arthralgias.   Neurological:  Negative for dizziness, speech difficulty, light-headedness and numbness.      MEDICAL HISTORY:   History reviewed. No pertinent past medical history.    Past Surgical History:   Procedure Laterality Date    ABDOMINAL SURGERY      abdominal plasty    CLOSED RX TOE FX,MANIPULATN Bilateral 07/10/2015    Procedure: OPEN REDUCTION PERCUTANEOUS PINNING TOE;  Surgeon: Yogesh Mas MD;  [facility]: [facility]    FLUOROSCOPE EXAMINATION Bilateral 07/10/2015    Procedure: OPEN REDUCTION PERCUTANEOUS PINNING TOE;  Surgeon: Yogesh Mas MD;  Location: [facility]    INJECTION, ANESTHETIC/STEROID, TRANSFORAMINAL EPIDURAL; LUMBAR/SACRAL, SINGLE LEVEL  01/01/2005    OTHER SURGICAL HISTORY  04/2022    bilater breast reduction         Current Outpatient Medications:     Tirzepatide-Weight Management (ZEPBOUND) 2.5 MG/0.5ML Subcutaneous Solution Auto-injector, Inject 2.5 mg into the skin once a week., Disp: 6.5 mL, Rfl: 0    busPIRone 5 MG Oral Tab, Take 1 tablet (5 mg total) by mouth 3 (three) times daily., Disp: 270 tablet, Rfl: 0     Levonorgestrel-Ethinyl Estrad (AVIANE) 0.1-20 MG-MCG Oral Tab, Take 1 tablet by mouth daily., Disp: 84 each, Rfl: 3    emtricitabine-tenofovir (TRUVADA) 200-300 MG Oral Tab, Take 1 tablet by mouth daily., Disp: 90 tablet, Rfl: 1    metoprolol succinate ER 25 MG Oral Tablet 24 Hr, , Disp: , Rfl:     Allergies:No Known Allergies    Social History     Socioeconomic History    Marital status:      Spouse name: Not on file    Number of children: Not on file    Years of education: Not on file    Highest education level: Not on file   Occupational History    Not on file   Tobacco Use    Smoking status: Never    Smokeless tobacco: Never   Vaping Use    Vaping Use: Never used   Substance and Sexual Activity    Alcohol use: No    Drug use: No    Sexual activity: Not on file   Other Topics Concern    Not on file   Social History Narrative    Not on file     Social Determinants of Health     Financial Resource Strain: Not on file   Food Insecurity: Not on file   Transportation Needs: Not on file   Physical Activity: Not on file   Stress: Not on file   Social Connections: Not on file   Housing Stability: Not on file       PHYSICAL EXAM:   Unable to perform vitals or do physical exam as this is a virtual video visit.  Patient appears alert.  No conversational dyspnea or distress.    ASSESSMENT/PLAN:     Nutritional Counseling: educate, track, dietician     1. Overweight with body mass index (BMI) of 29 to 29.9 in adult  2. Moderate mixed hyperlipidemia not requiring statin therapy  3. Primary hypertension     -Initial BMI 29.71, weight 164 lbs  -patient failed structured diet and exercise program>6months  -labs 2022 elevated , ALT 50  -Hx of occasional tachycardia, no hx of CAD  -EKG and stress test 2022 wnl  -Started phentermine contraindicated due to elevation in BP  -Topiramate caused decrease energy and brain fog  - Contraindication to Qsymia and Contrave, Diethylpropion due to elevation of blood  pressure  -Wegovy is out of stock right now  -Echo done 12/2022; reviewed by cardiology, per patient EF normal  -labs done 8/2023 with increased neutrophils, normal TSH, CBC, lft, low vit D and elevated cholesterol  -patient is interested in GLP1 medications, patient denies any personal or family history of MTC or in patients with Multiple Endocrine Neoplasia syndrome type 2 (MEN 2). Counseled patient regarding the potential risk for MTC with the use of semaglutide and inform them of symptoms of thyroid tumors (eg, a mass in the neck, dysphagia, dyspnea, persistent hoarseness).     Plan:  -Zepbound 2.5 mg weekly  -Metformin 500 mg ER BID (instructed to titrate up as tolerated)  -Discussed Kcal goals <1800 Kcal, carbohydrate goal <100 grams  -Exercise goal 1 hour daily  -Stress management 10 minutes meditation before bedtime  -Drink at least 64 oz water daily        Regarding Obesity:  Consulted on nutrition and activity  Advised portion-controlled/low carb diet <100 g daily.  Discussed sleep and stress management.     Regarding weight loss medications.  I've discussed with patients the risks and benefits of such weight loss medications.  That these are treatments to be used in conjunction with diet and exercise for promoting health and weight loss.          Time spent on encounter  20 minutes   Video time 10 minutes   Documentation time 10 minutes     Tanisha Mack understands video evaluation is not a substitute for face-to-face examination or emergency care. Patient advised to go to ER or call 911 for worsening symptoms or acute distress.     Telehealth outside of [facility]  Telehealth Verbal Consent   I conducted a telehealth visit with Tanisha Mack today, 02/08/24, which was completed using two-way, real-time interactive audio and video communication. This has been done in good davey to provide continuity of care in the best interest of the provider-patient relationship, due to the COVID -19 public  health crisis/national emergency where restrictions of face-to-face office visits are ongoing. Every conscious effort was taken to allow for sufficient and adequate time to complete the visit.  The patient was made aware of the limitations of the telehealth visit, including treatment limitations as no physical exam could be performed.  The patient was advised to call 911 or to go to the ER in case there was an emergency.  The patient was also advised of the potential privacy & security concerns related to the telehealth platform.   The patient was made aware of where to find [facility]'s notice of privacy practices, telehealth consent form and other related consent forms and documents.  which are located on the [facility] website. The patient verbally agreed to telehealth consent form, related consents and the risks discussed.    Lastly, the patient confirmed that they were in [place].   Included in this visit, time may have been spent reviewing labs, medications, radiology tests and decision making. Appropriate medical decision-making and tests are ordered as detailed in the plan of care above.  Coding/billing information is submitted for this visit based on complexity of care and/or time spent for the visit.    This note was prepared using Dragon Medical voice recognition dictation software. As a result errors may occur. When identified these errors have been corrected. While every attempt is made to correct errors during dictation discrepancies may still exist.    Erin Holder MD  2/8/2024

## 2024-02-09 ENCOUNTER — PATIENT MESSAGE (OUTPATIENT)
Dept: INTERNAL MEDICINE CLINIC | Facility: CLINIC | Age: 39
End: 2024-02-09

## 2024-02-09 ENCOUNTER — TELEPHONE (OUTPATIENT)
Dept: INTERNAL MEDICINE CLINIC | Facility: CLINIC | Age: 39
End: 2024-02-09

## 2024-02-09 DIAGNOSIS — E78.2 MODERATE MIXED HYPERLIPIDEMIA NOT REQUIRING STATIN THERAPY: ICD-10-CM

## 2024-02-09 DIAGNOSIS — E66.3 OVERWEIGHT WITH BODY MASS INDEX (BMI) OF 29 TO 29.9 IN ADULT: ICD-10-CM

## 2024-02-09 DIAGNOSIS — I10 PRIMARY HYPERTENSION: ICD-10-CM

## 2024-02-09 RX ORDER — TIRZEPATIDE 2.5 MG/.5ML
2.5 INJECTION, SOLUTION SUBCUTANEOUS WEEKLY
Qty: 6.5 ML | Refills: 0 | Status: SHIPPED | OUTPATIENT
Start: 2024-02-09

## 2024-02-09 NOTE — TELEPHONE ENCOUNTER
Patient calling to ask to change Zepbound to [facility] in [place], see patient message on 02/09/24.

## 2024-02-09 NOTE — TELEPHONE ENCOUNTER
Attempted to have [facility] transfer the prescription, on hold for 10 minutes. Prescription transferred to [facility]. Patient notified through PushPage; see TE 2/9 - Patient Message.

## 2024-02-09 NOTE — TELEPHONE ENCOUNTER
Prior auth for zepbound completed  Await outcome     Approval Details    Authorized from January 10, 2024 to October 6, 2024   Information received electronically from payer

## 2024-02-29 DIAGNOSIS — I10 PRIMARY HYPERTENSION: ICD-10-CM

## 2024-02-29 DIAGNOSIS — E66.3 OVERWEIGHT WITH BODY MASS INDEX (BMI) OF 29 TO 29.9 IN ADULT: ICD-10-CM

## 2024-02-29 DIAGNOSIS — E78.2 MODERATE MIXED HYPERLIPIDEMIA NOT REQUIRING STATIN THERAPY: ICD-10-CM

## 2024-02-29 RX ORDER — TIRZEPATIDE 2.5 MG/.5ML
2.5 INJECTION, SOLUTION SUBCUTANEOUS WEEKLY
Qty: 6.5 ML | Refills: 0 | OUTPATIENT
Start: 2024-02-29

## 2024-03-04 DIAGNOSIS — E66.3 OVERWEIGHT WITH BODY MASS INDEX (BMI) OF 29 TO 29.9 IN ADULT: ICD-10-CM

## 2024-03-04 DIAGNOSIS — E78.2 MODERATE MIXED HYPERLIPIDEMIA NOT REQUIRING STATIN THERAPY: ICD-10-CM

## 2024-03-04 DIAGNOSIS — I10 PRIMARY HYPERTENSION: ICD-10-CM

## 2024-03-05 RX ORDER — TIRZEPATIDE 2.5 MG/.5ML
2.5 INJECTION, SOLUTION SUBCUTANEOUS WEEKLY
Refills: 0 | OUTPATIENT
Start: 2024-03-05

## 2024-03-11 ENCOUNTER — TELEPHONE (OUTPATIENT)
Dept: INTERNAL MEDICINE CLINIC | Facility: CLINIC | Age: 39
End: 2024-03-11

## 2024-03-11 NOTE — TELEPHONE ENCOUNTER
[facility]ed [facility]  Patient obtained 2ml for 28 days on 2/12/24  Already has approval on file    #[phone]    Insurance is stating she would need to jump up to the 5mg dose now. Only 1 month approved for 2.5mg  Please advise

## 2024-03-11 NOTE — TELEPHONE ENCOUNTER
Patient calling in regards to Zepbound 2.5 mg  States she went to refill the medication and was informed that the exceeded her limitations and to have the pharmacist call the insurance and was told by the pharmacist that the insurance told them the same thing and they could not do anything for her    Was previously advised that the prior auth for Zepbound is good through Oct 6 (confirmed that it is and also confirmed with a CMA)    Advised patient to call her insurance and ask as to why it has a limitation on it--states she did and they told her they didn't know.  Advised patient to call insurance and ask for a covered alternative and dose strength and to call our office back.  Patient verbalized understanding and will call insurance now and call our office back

## 2024-03-11 NOTE — TELEPHONE ENCOUNTER
Patient contacted her insurance and was told a quantity limit exception could be requested.  [phone].  Routed to prior auth staff.

## 2024-03-13 NOTE — TELEPHONE ENCOUNTER
Dr Stinson for Dr. Holder please see Yesy HAMEED message below and pt is calling today. Please advise

## 2024-03-13 NOTE — TELEPHONE ENCOUNTER
Dr. Holder please advise as patient needs a new script for Zepbound 5 mg  *Patient also has the question of-since she missed her dose on Monday 3/11- what should she do?  Please advise. Thank You.

## 2024-03-13 NOTE — TELEPHONE ENCOUNTER
Patient is following up. Patient dose this week was suppose to be on Monday. Please advise if patient is able to make up the missed injection.

## 2024-03-14 ENCOUNTER — TELEPHONE (OUTPATIENT)
Dept: INTERNAL MEDICINE CLINIC | Facility: CLINIC | Age: 39
End: 2024-03-14

## 2024-03-14 RX ORDER — TIRZEPATIDE 5 MG/.5ML
5 INJECTION, SOLUTION SUBCUTANEOUS WEEKLY
Qty: 2 ML | Refills: 0 | Status: SHIPPED | OUTPATIENT
Start: 2024-03-14

## 2024-03-14 NOTE — TELEPHONE ENCOUNTER
Advised patient of Dr Holder's note. Patient verbalized understanding.     Patient plans to start 5 mg on 3/18/24.     Patient has a follow up on 3/18/24. The next available to follow up in 1 month is 4/16/24. She is available to be seen in the office on Mondays but can do a video visit on other days. Do you want her to reschedule her appointment to next month?    Future Appointments   Date Time Provider Department Center   3/18/2024  1:40 PM Erin Holder MD [facility]   4/18/2024 11:00 AM Gunjan Collins MD [facility]

## 2024-03-14 NOTE — TELEPHONE ENCOUNTER
Appointment made    Future Appointments   Date Time Provider Department Center   4/1/2024 11:00 AM [facility]   4/18/2024 11:00 AM Gunjan Collins MD [facility] [place]

## 2024-03-15 ENCOUNTER — PATIENT OUTREACH (OUTPATIENT)
Dept: CASE MANAGEMENT | Age: 39
End: 2024-03-15

## 2024-03-15 NOTE — PROCEDURES
Received order requesting to update PCP to Dr. Erin Holder is Approved and finalized on March 15, 2024.    Thanks,  [facility] Team

## 2024-04-01 ENCOUNTER — TELEMEDICINE (OUTPATIENT)
Dept: INTERNAL MEDICINE CLINIC | Facility: CLINIC | Age: 39
End: 2024-04-01
Payer: COMMERCIAL

## 2024-04-01 DIAGNOSIS — E78.2 MODERATE MIXED HYPERLIPIDEMIA NOT REQUIRING STATIN THERAPY: Primary | ICD-10-CM

## 2024-04-01 DIAGNOSIS — E66.3 OVERWEIGHT WITH BODY MASS INDEX (BMI) OF 29 TO 29.9 IN ADULT: ICD-10-CM

## 2024-04-01 DIAGNOSIS — I10 PRIMARY HYPERTENSION: ICD-10-CM

## 2024-04-01 PROCEDURE — 99214 OFFICE O/P EST MOD 30 MIN: CPT | Performed by: INTERNAL MEDICINE

## 2024-04-01 RX ORDER — TIRZEPATIDE 5 MG/.5ML
5 INJECTION, SOLUTION SUBCUTANEOUS WEEKLY
Qty: 2 ML | Refills: 1 | Status: SHIPPED | OUTPATIENT
Start: 2024-04-01

## 2024-04-01 NOTE — PROGRESS NOTES
Patient ID: Tanisha Mack is a 38 year old female.  No chief complaint on file.     HISTORY OF PRESENT ILLNESS:   Patient presents for above.  This visit is conducted using Telemedicine with live, interactive video and audio.    Initial weight: 164 lbs 4/2023  Current weight: 149 lbs  Interval weight loss: 7 lbs  Total weight loss: 14 lbs    Review of Systems   Constitutional:  Negative for appetite change, fatigue and fever.   HENT:  Negative for drooling, ear pain, hearing loss and sore throat.    Eyes:  Negative for pain and redness.   Respiratory:  Negative for choking, shortness of breath, wheezing and stridor.    Cardiovascular:  Negative for chest pain and palpitations.   Gastrointestinal:  Negative for abdominal pain.   Endocrine: Negative for polydipsia and polyphagia.   Musculoskeletal:  Negative for arthralgias.   Neurological:  Negative for dizziness, speech difficulty, light-headedness and numbness.      MEDICAL HISTORY:   History reviewed. No pertinent past medical history.    Past Surgical History:   Procedure Laterality Date    ABDOMINAL SURGERY      abdominal plasty    CLOSED RX TOE FX,MANIPULATN Bilateral 07/10/2015    Procedure: OPEN REDUCTION PERCUTANEOUS PINNING TOE;  Surgeon: Yogesh Mas MD;  Location: [facility]    FLUOROSCOPE EXAMINATION Bilateral 07/10/2015    Procedure: OPEN REDUCTION PERCUTANEOUS PINNING TOE;  Surgeon: Yogesh Mas MD;  Location: [facility]    INJECTION, ANESTHETIC/STEROID, TRANSFORAMINAL EPIDURAL; LUMBAR/SACRAL, SINGLE LEVEL  01/01/2005    OTHER SURGICAL HISTORY  04/2022    bilater breast reduction         Current Outpatient Medications:     Tirzepatide-Weight Management (ZEPBOUND) 5 MG/0.5ML Subcutaneous Solution Auto-injector, Inject 5 mg into the skin once a week., Disp: 2 mL, Rfl: 1    busPIRone 5 MG Oral Tab, Take 1 tablet (5 mg total) by mouth 3 (three) times daily., Disp: 270 tablet, Rfl: 0    Levonorgestrel-Ethinyl Estrad (AVIANE)  0.1-20 MG-MCG Oral Tab, Take 1 tablet by mouth daily., Disp: 84 each, Rfl: 3    emtricitabine-tenofovir (TRUVADA) 200-300 MG Oral Tab, Take 1 tablet by mouth daily., Disp: 90 tablet, Rfl: 1    metoprolol succinate ER 25 MG Oral Tablet 24 Hr, , Disp: , Rfl:     Allergies:No Known Allergies    Social History     Socioeconomic History    Marital status:      Spouse name: Not on file    Number of children: Not on file    Years of education: Not on file    Highest education level: Not on file   Occupational History    Not on file   Tobacco Use    Smoking status: Never    Smokeless tobacco: Never   Vaping Use    Vaping Use: Never used   Substance and Sexual Activity    Alcohol use: No    Drug use: No    Sexual activity: Not on file   Other Topics Concern    Not on file   Social History Narrative    Not on file     Social Determinants of Health     Financial Resource Strain: Not on file   Food Insecurity: Not on file   Transportation Needs: Not on file   Physical Activity: Not on file   Stress: Not on file   Social Connections: Not on file   Housing Stability: Not on file       PHYSICAL EXAM:   Unable to perform vitals or do physical exam as this is a virtual video visit.  Patient appears alert.  No conversational dyspnea or distress.    ASSESSMENT/PLAN:     Nutritional Counseling: educate, track, dietician     1. Overweight with body mass index (BMI) of 29 to 29.9 in adult  2. Moderate mixed hyperlipidemia not requiring statin therapy  3. Primary hypertension     -Initial BMI 29.71, weight 164 lbs  -patient failed structured diet and exercise program>6months  -labs 2022 elevated , ALT 50  -Hx of occasional tachycardia, no hx of CAD  -EKG and stress test 2022 wnl  -Started phentermine contraindicated due to elevation in BP  -Topiramate caused decrease energy and brain fog  - Contraindication to Qsymia and Contrave, Diethylpropion due to elevation of blood pressure  -Wegovy is out of stock right now  -Echo  done 12/2022; reviewed by cardiology, per patient EF normal  -labs done 8/2023 with increased neutrophils, normal TSH, CBC, lft, low vit D and elevated cholesterol  -patient is interested in GLP1 medications, patient denies any personal or family history of MTC or in patients with Multiple Endocrine Neoplasia syndrome type 2 (MEN 2). Counseled patient regarding the potential risk for MTC with the use of semaglutide and inform them of symptoms of thyroid tumors (eg, a mass in the neck, dysphagia, dyspnea, persistent hoarseness).     Plan:  -Zepbound 2.5 mg weekly, 1 refill  -Metformin 500 mg ER BID (instructed to titrate up as tolerated)  -Discussed Kcal goals <1800 Kcal, carbohydrate goal <100 grams  -Exercise goal 1 hour daily  -Stress management 10 minutes meditation before bedtime  -Drink at least 64 oz water daily        Regarding Obesity:  Consulted on nutrition and activity  Advised portion-controlled/low carb diet <100 g daily.  Discussed sleep and stress management.     Regarding weight loss medications.  I've discussed with patients the risks and benefits of such weight loss medications.  That these are treatments to be used in conjunction with diet and exercise for promoting health and weight loss.          Time spent on encounter  20 minutes   Video time 10 minutes   Documentation time 10 minutes     Tanisha Mack understands video evaluation is not a substitute for face-to-face examination or emergency care. Patient advised to go to ER or call 911 for worsening symptoms or acute distress.     Telehealth outside of [facility]  Telehealth Verbal Consent   I conducted a telehealth visit with Tanisha Mack today, 02/08/24, which was completed using two-way, real-time interactive audio and video communication. This has been done in good davey to provide continuity of care in the best interest of the provider-patient relationship, due to the COVID -19 public health crisis/national emergency where  restrictions of face-to-face office visits are ongoing. Every conscious effort was taken to allow for sufficient and adequate time to complete the visit.  The patient was made aware of the limitations of the telehealth visit, including treatment limitations as no physical exam could be performed.  The patient was advised to call 911 or to go to the ER in case there was an emergency.  The patient was also advised of the potential privacy & security concerns related to the telehealth platform.   The patient was made aware of where to find [facility]'s notice of privacy practices, telehealth consent form and other related consent forms and documents.  which are located on the [facility] website. The patient verbally agreed to telehealth consent form, related consents and the risks discussed.    Lastly, the patient confirmed that they were in [place].   Included in this visit, time may have been spent reviewing labs, medications, radiology tests and decision making. Appropriate medical decision-making and tests are ordered as detailed in the plan of care above.  Coding/billing information is submitted for this visit based on complexity of care and/or time spent for the visit.    This note was prepared using Dragon Medical voice recognition dictation software. As a result errors may occur. When identified these errors have been corrected. While every attempt is made to correct errors during dictation discrepancies may still exist.    Erin Holder MD

## 2024-04-08 RX ORDER — BUSPIRONE HYDROCHLORIDE 5 MG/1
5 TABLET ORAL 3 TIMES DAILY
Qty: 270 TABLET | Refills: 0 | OUTPATIENT
Start: 2024-04-08

## 2024-04-14 ENCOUNTER — PATIENT MESSAGE (OUTPATIENT)
Dept: INTERNAL MEDICINE CLINIC | Facility: CLINIC | Age: 39
End: 2024-04-14

## 2024-04-15 RX ORDER — TIRZEPATIDE 7.5 MG/.5ML
7.5 INJECTION, SOLUTION SUBCUTANEOUS WEEKLY
Qty: 2 ML | Refills: 0 | Status: SHIPPED | OUTPATIENT
Start: 2024-04-15 | End: 2024-05-07

## 2024-04-15 NOTE — TELEPHONE ENCOUNTER
From: Tanisha Mack  To: Erin Holder  Sent: 4/14/2024 9:25 AM CDT  Subject: Zepbound Refill    Good morning, Zepbound 5mg is currently on back order and I am unable to get my prescription filled. The pharmacy states that 7.5 mg and 10 mg is available. Can I please get a prescription for one of the doses that are available? Thank you.

## 2024-04-18 ENCOUNTER — OFFICE VISIT (OUTPATIENT)
Dept: FAMILY MEDICINE CLINIC | Facility: CLINIC | Age: 39
End: 2024-04-18
Payer: COMMERCIAL

## 2024-04-18 VITALS
OXYGEN SATURATION: 99 % | WEIGHT: 149 LBS | RESPIRATION RATE: 16 BRPM | SYSTOLIC BLOOD PRESSURE: 130 MMHG | DIASTOLIC BLOOD PRESSURE: 94 MMHG | HEART RATE: 102 BPM | BODY MASS INDEX: 27.07 KG/M2 | HEIGHT: 62.3 IN

## 2024-04-18 DIAGNOSIS — Z12.4 SCREENING FOR CERVICAL CANCER: ICD-10-CM

## 2024-04-18 DIAGNOSIS — Z01.419 ENCOUNTER FOR WELL WOMAN EXAM WITH ROUTINE GYNECOLOGICAL EXAM: Primary | ICD-10-CM

## 2024-04-18 DIAGNOSIS — Z12.31 VISIT FOR SCREENING MAMMOGRAM: ICD-10-CM

## 2024-04-18 DIAGNOSIS — Z00.00 LABORATORY EXAMINATION ORDERED AS PART OF A ROUTINE GENERAL MEDICAL EXAMINATION: ICD-10-CM

## 2024-04-18 DIAGNOSIS — Z12.4 ENCOUNTER FOR SCREENING FOR MALIGNANT NEOPLASM OF CERVIX: ICD-10-CM

## 2024-04-18 PROCEDURE — 99395 PREV VISIT EST AGE 18-39: CPT | Performed by: FAMILY MEDICINE

## 2024-04-18 PROCEDURE — 87624 HPV HI-RISK TYP POOLED RSLT: CPT | Performed by: FAMILY MEDICINE

## 2024-04-18 PROCEDURE — 96127 BRIEF EMOTIONAL/BEHAV ASSMT: CPT | Performed by: FAMILY MEDICINE

## 2024-04-18 PROCEDURE — 88175 CYTOPATH C/V AUTO FLUID REDO: CPT | Performed by: FAMILY MEDICINE

## 2024-04-18 NOTE — PROGRESS NOTES
Tanisha Mack is a 39 year old female who presents for a complete physical exam.   HPI:     Chief Complaint   Patient presents with    Physical    Pap       Patient feels well, dental visit up to date, no hearing problem.  Vaccinations up to date.  , 2 babies  Period:regular  Sexual activity:monogamy   Contraception:condoms  Pt's  has HIV.  Exercise:  twice per week, occasional.  Diet: watches sugar closely and watches somewhat    Wt Readings from Last 3 Encounters:   24 149 lb (67.6 kg)   23 157 lb (71.2 kg)   23 153 lb 9.6 oz (69.7 kg)      BP Readings from Last 3 Encounters:   24 (!) 130/94   23 130/80   23 112/79     Patient's last menstrual period was 2024 (exact date).           Current Outpatient Medications   Medication Sig Dispense Refill    busPIRone 5 MG Oral Tab Take 1 tablet (5 mg total) by mouth 3 (three) times daily. (Patient taking differently: Take 1 tablet (5 mg total) by mouth as needed. Pt states that she takes as needed) 270 tablet 0    Levonorgestrel-Ethinyl Estrad (AVIANE) 0.1-20 MG-MCG Oral Tab Take 1 tablet by mouth daily. 84 each 3    emtricitabine-tenofovir (TRUVADA) 200-300 MG Oral Tab Take 1 tablet by mouth daily. 90 tablet 1    metoprolol succinate ER 25 MG Oral Tablet 24 Hr Take 1 tablet (25 mg total) by mouth. As needed      Tirzepatide-Weight Management (ZEPBOUND) 7.5 MG/0.5ML Subcutaneous Solution Auto-injector Inject 7.5 mg into the skin once a week for 4 doses. (Patient not taking: Reported on 2024) 2 mL 0      No past medical history on file.   Past Surgical History:   Procedure Laterality Date    Abdominal surgery      abdominal plasty    Closed rx toe fx,manipulatn Bilateral 07/10/2015    Procedure: OPEN REDUCTION PERCUTANEOUS PINNING TOE;  Surgeon: Yogesh Mas MD;  [facility]: [facility]    Fluoroscope examination Bilateral 07/10/2015    Procedure: OPEN REDUCTION PERCUTANEOUS PINNING TOE;  Surgeon:  Yogesh Mas MD;  Location: [facility]    Injection, anesthetic/steroid, transforaminal epidural; lumbar/sacral, single level  01/01/2005    Other surgical history  04/2022    bilater breast reduction      Family History   Problem Relation Age of Onset    Lipids Father     Diabetes Father     Other (Other) Father         MI    Hypertension Father     Lipids Paternal Grandmother     Hypertension Paternal Grandmother     Diabetes Paternal Grandmother       Social History     Socioeconomic History    Marital status:    Tobacco Use    Smoking status: Never    Smokeless tobacco: Never   Vaping Use    Vaping status: Never Used   Substance and Sexual Activity    Alcohol use: No    Drug use: No        REVIEW OF SYSTEMS:   GENERAL HEALTH: feels well, no fatigue.  SKIN: denies any unusual skin lesions or rashes  EYES: no visual complaints or deficits  HEENT: denies nasal congestion, sinus pain or sore throat; hearing loss negative,   RESPIRATORY: denies shortness of breath, wheezing or cough   CARDIOVASCULAR: denies chest pain, SOB, edema,orthopnea, no palpitations   GI: denies nausea, vomiting, constipation, diarrhea; no rectal bleeding; no heartburn  GENITAL/: no dysuria, urgency or frequency  MUSCULOSKELETAL: no joint complaints upper or lower extremities  NEURO: no sensory or motor complaint  HEMATOLOGY: denies hx anemia; denies bruising or excessive bleeding  ENDOCRINE: denies excessive thirst or urination; denies unexpected wt gain or wt loss  ALLERGY/IMM.: denies food or seasonal allergies  PSYCH: no symptoms of depression or anxiety, depression screening negative.      EXAM:   BP (!) 130/94   Pulse 102   Resp 16   Ht 5' 2.3\" (1.582 m)   Wt 149 lb (67.6 kg)   LMP 04/07/2024 (Exact Date)   SpO2 99%   BMI 26.99 kg/m²      General: WD/WN in no acute distress.   HEENT: PERRLA and EOMI.  OP moist no lesions.TM WNL, xiomy.Normal ears canals bilaterally.  Neck is supple, with no cervical LAD or thyroid  abnormalities. No carotid bruits.    Lungs: are clear to auscultation bilaterally, with no wheeze, rhonchi, or rales.   Heart: is RRR.  S1, S2, with no murmurs,clicks, gallops  Breast:No palpable masses, no axillary LAD, no nipple D/C, drainage or retractions.  Abdomen: is soft,NBS, NT/ND with no HSM.  No rebound or guarding. No CVA tenderness, no hernias.   exam: Speculum placed and vaginal wall visualizes without abnormalities and Liquid Based PAP performed.  Cervix is normal, non-friable, with a closed OS and no abnormal D/C. Bimanual exam shows no CMT or adenexal masses or tenderness.  Rectal exam: has normal tone, no masses.  Neuro: Cranial nerves II-XII normal,no focal abnormalities, and reflexes coordination and gait normal and symmetric.Sensation intact.  Extremities: are symmetric with no cyanosis, clubbing, or edema.  MS: Normal muscles tones, no joints abnormalities.  SKIN: Normal color, turgor, no lesions, rashes or wounds.L ankle small irregular mole.  PSYCH: normal affect and mood.          ASSESSMENT AND PLAN:     Tanisha Mack is a 39 year old female who presents for a complete physical exam.Gyn exam. Pap smear done.  Pt's was recommended low fat diet and aerobic exercise 30 minutes three times weekly.   S/p breast reduction, doing great.  Pt sees Dr. Zuniga.    Encounter for well woman exam with routine gynecological exam  -     ThinPrep PAP Smear; Future  -     Hpv Dna  High Risk , Thin Prep Collect; Future  -     ThinPrep PAP Smear  -     Hpv Dna  High Risk , Thin Prep Collect    Encounter for screening for malignant neoplasm of cervix  -     ThinPrep PAP Smear; Future  -     Hpv Dna  High Risk , Thin Prep Collect; Future  -     ThinPrep PAP Smear  -     Hpv Dna  High Risk , Thin Prep Collect    Laboratory examination ordered as part of a routine general medical examination  -     CBC With Differential With Platelet  -     Comp Metabolic Panel (14)  -     Lipid Panel  -     Vitamin D;  Future  -     TSH W Reflex To Free T4    Visit for screening mammogram  -     [facility] SANCHEZ 2D+3D SCREENING BILAT (CPT=77067/92516); Future        The patient indicates understanding of these issues and agrees to the plan.  The patient is asked to return for CPX in 1 year.

## 2024-04-19 LAB — HPV I/H RISK 1 DNA SPEC QL NAA+PROBE: NEGATIVE

## 2024-04-24 LAB
.: NORMAL
.: NORMAL

## 2024-06-11 RX ORDER — EMTRICITABINE AND TENOFOVIR DISOPROXIL FUMARATE 200; 300 MG/1; MG/1
1 TABLET, FILM COATED ORAL DAILY
Qty: 90 TABLET | Refills: 1 | Status: SHIPPED | OUTPATIENT
Start: 2024-06-11

## 2024-07-03 ENCOUNTER — OFFICE VISIT (OUTPATIENT)
Dept: INTERNAL MEDICINE CLINIC | Facility: CLINIC | Age: 39
End: 2024-07-03
Payer: COMMERCIAL

## 2024-07-03 VITALS
HEIGHT: 62.3 IN | SYSTOLIC BLOOD PRESSURE: 134 MMHG | HEART RATE: 100 BPM | WEIGHT: 150 LBS | DIASTOLIC BLOOD PRESSURE: 82 MMHG | BODY MASS INDEX: 27.26 KG/M2

## 2024-07-03 DIAGNOSIS — I10 PRIMARY HYPERTENSION: ICD-10-CM

## 2024-07-03 DIAGNOSIS — E78.5 HYPERLIPIDEMIA, UNSPECIFIED HYPERLIPIDEMIA TYPE: ICD-10-CM

## 2024-07-03 DIAGNOSIS — E78.2 MODERATE MIXED HYPERLIPIDEMIA NOT REQUIRING STATIN THERAPY: Primary | ICD-10-CM

## 2024-07-03 DIAGNOSIS — E55.9 VITAMIN D DEFICIENCY: ICD-10-CM

## 2024-07-03 DIAGNOSIS — E66.3 OVERWEIGHT WITH BODY MASS INDEX (BMI) OF 29 TO 29.9 IN ADULT: ICD-10-CM

## 2024-07-03 PROCEDURE — 99214 OFFICE O/P EST MOD 30 MIN: CPT | Performed by: INTERNAL MEDICINE

## 2024-07-03 RX ORDER — TIRZEPATIDE 7.5 MG/.5ML
7.5 INJECTION, SOLUTION SUBCUTANEOUS WEEKLY
Qty: 2 ML | Refills: 1 | Status: SHIPPED | OUTPATIENT
Start: 2024-07-03

## 2024-07-03 RX ORDER — LOSARTAN POTASSIUM 25 MG/1
25 TABLET ORAL DAILY
Qty: 90 TABLET | Refills: 0 | Status: SHIPPED | OUTPATIENT
Start: 2024-07-03 | End: 2024-10-01

## 2024-07-03 NOTE — PROGRESS NOTES
CC:   Chief Complaint   Patient presents with    Follow - Up      Reason for medical consultation: Medical Management of Weight and Weight related comorbidities.      HPI:     Initial weight: 164 lbs 4/2023  Current weight: 150 lbs  Interval weight loss: 1+ lbs  Total weight loss: 14 lbs    BP (!) 131/91 (BP Location: Right arm, Patient Position: Sitting, Cuff Size: adult)   Pulse 100   Ht 5' 2.3\" (1.582 m)   Wt 150 lb (68 kg)   LMP 06/01/2024 (Approximate)   BMI 27.17 kg/m²     LABS and RESULTS:     Office Visit on 04/10/2023   Component Date Value    Glucose Urine 04/10/2023 Negative     Bilirubin Urine 04/10/2023 Negative     Ketones, UA 04/10/2023 Negative     Spec Gravity 04/10/2023 1.020     Blood Urine 04/10/2023 Negative     PH Urine 04/10/2023 5.5     Protein Urine 04/10/2023 Negative     Urobilinogen Urine 04/10/2023 0.2     Nitrite Urine 04/10/2023 Negative     Leukocyte Esterase Urine 04/10/2023 Small (A)     APPEARANCE 04/10/2023 clear     Color Urine 04/10/2023 yellow     Multistix Lot# 04/10/2023 [id number]     Multistix Expiration Date 04/10/2023 11/30/2023        Current Outpatient Medications   Medication Sig Dispense Refill    Tirzepatide-Weight Management (ZEPBOUND) 7.5 MG/0.5ML Subcutaneous Solution Auto-injector Inject 7.5 mg into the skin once a week. 2 mL 1    emtricitabine-tenofovir (TRUVADA) 200-300 MG Oral Tab Take 1 tablet by mouth daily. 90 tablet 1    metoprolol succinate ER 25 MG Oral Tablet 24 Hr Take 1 tablet (25 mg total) by mouth. As needed      Levonorgestrel-Ethinyl Estrad (AVIANE) 0.1-20 MG-MCG Oral Tab Take 1 tablet by mouth daily. (Patient not taking: Reported on 7/3/2024) 84 each 3      History reviewed. No pertinent past medical history.      Past Surgical History:   Procedure Laterality Date    Abdominal surgery      abdominal plasty    Closed rx toe fx,manipulatn Bilateral 07/10/2015    Procedure: OPEN REDUCTION PERCUTANEOUS PINNING TOE;  Surgeon: Yogesh Mas MD;   [facility]    Fluoroscope examination Bilateral 07/10/2015    Procedure: OPEN REDUCTION PERCUTANEOUS PINNING TOE;  Surgeon: Yogesh Mas MD;  Location: [facility]    Injection, anesthetic/steroid, transforaminal epidural; lumbar/sacral, single level  01/01/2005    Other surgical history  04/2022    bilater breast reduction       Social History:  Social History     Socioeconomic History    Marital status:    Tobacco Use    Smoking status: Never    Smokeless tobacco: Never   Vaping Use    Vaping status: Never Used   Substance and Sexual Activity    Alcohol use: No    Drug use: No     Family History:  Family History   Problem Relation Age of Onset    Lipids Father     Diabetes Father     Other (Other) Father         MI    Hypertension Father     Lipids Paternal Grandmother     Hypertension Paternal Grandmother     Diabetes Paternal Grandmother           REVIEW OF SYSTEMS:   10 point review of systems otherwise negative.  With the exception of HPI and assessment and plan        EXAM:   BP (!) 131/91 (BP Location: Right arm, Patient Position: Sitting, Cuff Size: adult)   Pulse 100   Ht 5' 2.3\" (1.582 m)   Wt 150 lb (68 kg)   LMP 06/01/2024 (Approximate)   BMI 27.17 kg/m²   Body mass index is 27.17 kg/m².     GENERAL: NAD, conversant  SKIN: good turgor, no jaundice  HEENT: no scleral icterus,conjunctiva are clear, oropharynx clear, nl external nose and ears  NECK: supple, no carotid bruits, thyroid normal  LYMPH: no cervical LAD, no supraclavicular LAD  LUNGS: nl effort, clear to auscultation bilaterally  CARDIO: RRR, no murmur  ABDOMEN: NT/ND, no masses, no HSM   EXTREMITIES: gait normal, no edema   PSYCH: Oriented times three, appropriate affect        ASSESSMENT AND PLAN:     Nutritional Counseling: educate, track, dietician    1. Moderate mixed hyperlipidemia not requiring statin therapy2. Overweight (BMI 25.0-29.9)  3. Primary Hypertension  4. Vitamin D deficiency    -Initial BMI  29.71, weight 164 lbs  -patient failed structured diet and exercise program>6months  -labs 2022 elevated , ALT 50  -Hx of occasional tachycardia, no hx of CAD  -EKG and stress test 2022 wnl  -Started phentermine contraindicated due to elevation in BP  -Topiramate caused decrease energy and brain fog  - Contraindication to Qsymia and Contrave, Diethylpropion due to elevation of blood pressure  -Wegovy is out of stock right now  -Metformin tried, no weight loss  -Echo done 12/2022; reviewed by cardiology, per patient EF normal  -labs done 8/2023 with increased neutrophils, normal TSH, CBC, lft, low vit D and elevated cholesterol  -patient is interested in GLP1 medications, patient denies any personal or family history of MTC or in patients with Multiple Endocrine Neoplasia syndrome type 2 (MEN 2). Counseled patient regarding the potential risk for MTC with the use of semaglutide and inform them of symptoms of thyroid tumors (eg, a mass in the neck, dysphagia, dyspnea, persistent hoarseness).      Plan:  - Zepbound 7.5 mg weekly, 1 refill  - losartan 25 mg  - Discussed Kcal goals <1800 Kcal, carbohydrate goal <100 grams  - Exercise goal 1 hour daily  - Stress management 10 minutes meditation before bedtime  - Drink at least 64 oz water daily      Regarding Obesity:  Consulted on nutrition and activity  Advised portion-controlled/low carb diet <100 g daily.  Discussed sleep and stress management.    Regarding weight loss medications.  I've discussed with patients the risks and benefits of such weight loss medications.  That these are treatments to be used in conjunction with diet and exercise for promoting health and weight loss.       Return in about 2 months (around 9/3/2024).     Erin Holder MD

## 2024-07-10 ENCOUNTER — PATIENT MESSAGE (OUTPATIENT)
Dept: INTERNAL MEDICINE CLINIC | Facility: CLINIC | Age: 39
End: 2024-07-10

## 2024-07-11 NOTE — TELEPHONE ENCOUNTER
From: Tanisha Mack  To: Erin Holder  Sent: 7/10/2024 7:57 PM CDT  Subject: Zepbound refill    Good evening, the insurance is denying the Zepbound refill of the 7.5 dose. They are stating they need the DrJaycee to do a SHAYLA for the quantity of the medication. The phone number for the SHAYLA [phone]. Otherwise they said the 10 mg Zepbound will be covered by my plan. Can you please let me know how to proceed? Thank you.

## 2024-07-15 RX ORDER — TIRZEPATIDE 10 MG/.5ML
10 INJECTION, SOLUTION SUBCUTANEOUS WEEKLY
Qty: 2 ML | Refills: 1 | Status: SHIPPED | OUTPATIENT
Start: 2024-07-15 | End: 2024-08-06

## 2024-07-23 ENCOUNTER — PATIENT MESSAGE (OUTPATIENT)
Dept: INTERNAL MEDICINE CLINIC | Facility: CLINIC | Age: 39
End: 2024-07-23

## 2024-07-24 RX ORDER — TIRZEPATIDE 10 MG/.5ML
10 INJECTION, SOLUTION SUBCUTANEOUS WEEKLY
Qty: 2 ML | Refills: 0 | Status: SHIPPED | OUTPATIENT
Start: 2024-07-24 | End: 2024-08-15

## 2024-07-24 NOTE — TELEPHONE ENCOUNTER
From: Tanisha Mack  To: Erin Holder  Sent: 7/23/2024 12:34 PM CDT  Subject: Pen malfunction    Good afternoon, one of my Zepbound pens malfunctioned and did not work. Due to this, the company is sending me a voucher for a new box. I know I currently have a 10 mg prescription on file, but they said the voucher will use that second prescription fill. Would I be able to get another prescription to fill my regular prescription because I typically get 2 fills prior to seeing the Dr. Thank you.

## 2024-08-12 ENCOUNTER — OFFICE VISIT (OUTPATIENT)
Dept: INTERNAL MEDICINE CLINIC | Facility: CLINIC | Age: 39
End: 2024-08-12

## 2024-08-12 VITALS
BODY MASS INDEX: 25.87 KG/M2 | OXYGEN SATURATION: 100 % | WEIGHT: 142.38 LBS | HEIGHT: 62.3 IN | SYSTOLIC BLOOD PRESSURE: 116 MMHG | DIASTOLIC BLOOD PRESSURE: 80 MMHG | HEART RATE: 88 BPM

## 2024-08-12 DIAGNOSIS — E55.9 VITAMIN D DEFICIENCY: ICD-10-CM

## 2024-08-12 DIAGNOSIS — I10 PRIMARY HYPERTENSION: ICD-10-CM

## 2024-08-12 DIAGNOSIS — E66.3 OVERWEIGHT (BMI 25.0-29.9): ICD-10-CM

## 2024-08-12 DIAGNOSIS — E78.2 MODERATE MIXED HYPERLIPIDEMIA NOT REQUIRING STATIN THERAPY: Primary | ICD-10-CM

## 2024-08-12 DIAGNOSIS — E78.5 HYPERLIPIDEMIA, UNSPECIFIED HYPERLIPIDEMIA TYPE: ICD-10-CM

## 2024-08-12 PROCEDURE — 99214 OFFICE O/P EST MOD 30 MIN: CPT | Performed by: INTERNAL MEDICINE

## 2024-08-12 RX ORDER — TIRZEPATIDE 10 MG/.5ML
10 INJECTION, SOLUTION SUBCUTANEOUS WEEKLY
Qty: 2 ML | Refills: 2 | Status: SHIPPED | OUTPATIENT
Start: 2024-08-12 | End: 2024-09-03

## 2024-08-12 RX ORDER — LOSARTAN POTASSIUM 25 MG/1
25 TABLET ORAL DAILY
Qty: 90 TABLET | Refills: 0 | Status: SHIPPED | OUTPATIENT
Start: 2024-08-12 | End: 2024-11-10

## 2024-08-12 NOTE — PROGRESS NOTES
CC:   Chief Complaint   Patient presents with    Follow - Up     2 month       Reason for medical consultation: Medical Management of Weight and Weight related comorbidities.      HPI:     Initial weight: 164 lbs 4/2023  Current weight: 142 lbs  Interval weight loss: 8 lbs  Total weight loss: 22 lbs    /80   Pulse 88   Ht 5' 2.3\" (1.582 m)   Wt 142 lb 6.4 oz (64.6 kg)   LMP 07/30/2024 (Approximate)   SpO2 100%   BMI 25.80 kg/m²     LABS and RESULTS:     Office Visit on 04/10/2023   Component Date Value    Glucose Urine 04/10/2023 Negative     Bilirubin Urine 04/10/2023 Negative     Ketones, UA 04/10/2023 Negative     Spec Gravity 04/10/2023 1.020     Blood Urine 04/10/2023 Negative     PH Urine 04/10/2023 5.5     Protein Urine 04/10/2023 Negative     Urobilinogen Urine 04/10/2023 0.2     Nitrite Urine 04/10/2023 Negative     Leukocyte Esterase Urine 04/10/2023 Small (A)     APPEARANCE 04/10/2023 clear     Color Urine 04/10/2023 yellow     Multistix Lot# 04/10/2023 [id number]     Multistix Expiration Date 04/10/2023 11/30/2023        Current Outpatient Medications   Medication Sig Dispense Refill    losartan 25 MG Oral Tab Take 1 tablet (25 mg total) by mouth daily. 90 tablet 0    Tirzepatide-Weight Management (ZEPBOUND) 10 MG/0.5ML Subcutaneous Solution Auto-injector Inject 10 mg into the skin once a week for 4 doses. 2 mL 2    emtricitabine-tenofovir (TRUVADA) 200-300 MG Oral Tab Take 1 tablet by mouth daily. 90 tablet 1    metoprolol succinate ER 25 MG Oral Tablet 24 Hr Take 1 tablet (25 mg total) by mouth. As needed      Levonorgestrel-Ethinyl Estrad (AVIANE) 0.1-20 MG-MCG Oral Tab Take 1 tablet by mouth daily. (Patient not taking: Reported on 7/3/2024) 84 each 3      History reviewed. No pertinent past medical history.      Past Surgical History:   Procedure Laterality Date    Abdominal surgery      abdominal plasty    Closed rx toe fx,manipulatn Bilateral 07/10/2015    Procedure: OPEN REDUCTION  PERCUTANEOUS PINNING TOE;  Surgeon: Yogesh Mas MD;  [facility]    Fluoroscope examination Bilateral 07/10/2015    Procedure: OPEN REDUCTION PERCUTANEOUS PINNING TOE;  Surgeon: Yogesh Mas MD;  Location: [facility]    Injection, anesthetic/steroid, transforaminal epidural; lumbar/sacral, single level  01/01/2005    Other surgical history  04/2022    bilater breast reduction       Social History:  Social History     Socioeconomic History    Marital status:    Tobacco Use    Smoking status: Never    Smokeless tobacco: Never   Vaping Use    Vaping status: Never Used   Substance and Sexual Activity    Alcohol use: No    Drug use: No     Family History:  Family History   Problem Relation Age of Onset    Lipids Father     Diabetes Father     Other (Other) Father         MI    Hypertension Father     Lipids Paternal Grandmother     Hypertension Paternal Grandmother     Diabetes Paternal Grandmother           REVIEW OF SYSTEMS:   10 point review of systems otherwise negative.  With the exception of HPI and assessment and plan        EXAM:   /80   Pulse 88   Ht 5' 2.3\" (1.582 m)   Wt 142 lb 6.4 oz (64.6 kg)   LMP 07/30/2024 (Approximate)   SpO2 100%   BMI 25.80 kg/m²   Body mass index is 25.8 kg/m².     GENERAL: NAD, conversant  SKIN: good turgor, no jaundice  HEENT: no scleral icterus,conjunctiva are clear, oropharynx clear, nl external nose and ears  NECK: supple, no carotid bruits, thyroid normal  LYMPH: no cervical LAD, no supraclavicular LAD  LUNGS: nl effort, clear to auscultation bilaterally  CARDIO: RRR, no murmur  ABDOMEN: NT/ND, no masses, no HSM   EXTREMITIES: gait normal, no edema   PSYCH: Oriented times three, appropriate affect        ASSESSMENT AND PLAN:     Nutritional Counseling: educate, track, dietician    1. Moderate mixed hyperlipidemia not requiring statin therapy  2. Overweight (BMI 25.0-29.9)  3. Primary Hypertension  4. Vitamin D  deficiency    -Initial BMI 29.71, weight 164 lbs  -patient failed structured diet and exercise program>6months  -labs 2023 elevated , ALT 50  -Hx of occasional tachycardia, no hx of CAD  -EKG and stress test 2022 wnl  -Started phentermine contraindicated due to elevation in BP  -Topiramate caused decrease energy and brain fog  - Contraindication to Qsymia and Contrave, Diethylpropion due to elevation of blood pressure  -Wegovy is out of stock right now  -Metformin tried, no weight loss  -Echo done 12/2022; reviewed by cardiology, per patient EF normal  -labs done 8/2023 with increased neutrophils, normal TSH, CBC, lft, low vit D and elevated cholesterol  -patient is interested in GLP1 medications, patient denies any personal or family history of MTC or in patients with Multiple Endocrine Neoplasia syndrome type 2 (MEN 2). Counseled patient regarding the potential risk for MTC with the use of semaglutide and inform them of symptoms of thyroid tumors (eg, a mass in the neck, dysphagia, dyspnea, persistent hoarseness).      Plan:  - Zepbound 10 mg weekly, 2 refill  - losartan 25 mg  - Discussed Kcal goals <1800 Kcal, carbohydrate goal <100 grams  - Exercise goal 1 hour daily  - Stress management 10 minutes meditation before bedtime  - Drink at least 64 oz water daily      Regarding Obesity:  Consulted on nutrition and activity  Advised portion-controlled/low carb diet <100 g daily.  Discussed sleep and stress management.    Regarding weight loss medications.  I've discussed with patients the risks and benefits of such weight loss medications.  That these are treatments to be used in conjunction with diet and exercise for promoting health and weight loss.       Return in about 3 months (around 11/12/2024).     Erin Holder MD

## 2024-08-27 ENCOUNTER — HOSPITAL ENCOUNTER (OUTPATIENT)
Dept: MAMMOGRAPHY | Facility: HOSPITAL | Age: 39
Discharge: HOME | End: 2024-08-27
Attending: FAMILY MEDICINE
Payer: COMMERCIAL

## 2024-08-27 DIAGNOSIS — Z12.31 VISIT FOR SCREENING MAMMOGRAM: ICD-10-CM

## 2024-08-27 PROCEDURE — 77063 BREAST TOMOSYNTHESIS BI: CPT | Performed by: FAMILY MEDICINE

## 2024-08-27 PROCEDURE — 77067 SCR MAMMO BI INCL CAD: CPT | Performed by: FAMILY MEDICINE

## 2024-09-09 ENCOUNTER — TELEPHONE (OUTPATIENT)
Dept: INTERNAL MEDICINE CLINIC | Facility: CLINIC | Age: 39
End: 2024-09-09

## 2024-09-09 NOTE — TELEPHONE ENCOUNTER
[employer] ([employer])/login  KEY; [id number]      Current Outpatient Medications:     Tirzepatide-Weight Management (ZEPBOUND) 10 MG/0.5ML Subcutaneous Solution Auto-injector, Inject 10 mg into the skin once a week for 4 doses., Disp: 2 mL, Rfl: 2

## 2024-09-09 NOTE — TELEPHONE ENCOUNTER
Patient received 3 refills of Zepbound 10 mg, patient now ready for 12.5 mg. Pended, please advise.   Prior auth approval on file which is good until 10/2024.

## 2024-09-10 RX ORDER — TIRZEPATIDE 12.5 MG/.5ML
12.5 INJECTION, SOLUTION SUBCUTANEOUS WEEKLY
Qty: 2 ML | Refills: 0 | Status: SHIPPED | OUTPATIENT
Start: 2024-09-10 | End: 2024-09-12

## 2024-09-12 RX ORDER — TIRZEPATIDE 10 MG/.5ML
10 INJECTION, SOLUTION SUBCUTANEOUS WEEKLY
Qty: 6 ML | Refills: 0 | Status: SHIPPED | OUTPATIENT
Start: 2024-09-12 | End: 2024-10-04

## 2024-10-07 ENCOUNTER — TELEPHONE (OUTPATIENT)
Dept: INTERNAL MEDICINE CLINIC | Facility: CLINIC | Age: 39
End: 2024-10-07

## 2024-10-07 RX ORDER — TIRZEPATIDE 12.5 MG/.5ML
12.5 INJECTION, SOLUTION SUBCUTANEOUS WEEKLY
Qty: 2 ML | Refills: 0 | Status: SHIPPED | OUTPATIENT
Start: 2024-10-07 | End: 2024-10-07

## 2024-10-07 RX ORDER — TIRZEPATIDE 12.5 MG/.5ML
12.5 INJECTION, SOLUTION SUBCUTANEOUS WEEKLY
Qty: 2 ML | Refills: 2 | Status: SHIPPED | OUTPATIENT
Start: 2024-10-07 | End: 2024-10-29

## 2024-10-07 NOTE — TELEPHONE ENCOUNTER
Pharmacy requesting PA      Disp Refills Start End    Tirzepatide-Weight Management (ZEPBOUND) 10 MG/0.5ML Subcutaneous Solution Auto-injector 6 mL 0 9/12      KEY  [id number]

## 2024-10-08 ENCOUNTER — PATIENT MESSAGE (OUTPATIENT)
Age: 39
End: 2024-10-08

## 2024-10-08 NOTE — TELEPHONE ENCOUNTER
From: Tanisha Mack  To: Erin Holder  Sent: 10/8/2024 11:30 AM CDT  Subject: Pre Authorization     Hi, the [facility] cannot refill my prescription for the Zepbound because they need a pre authorization. Would you be able to help with this? Thank you.

## 2024-11-01 ENCOUNTER — PATIENT MESSAGE (OUTPATIENT)
Dept: FAMILY MEDICINE CLINIC | Facility: CLINIC | Age: 39
End: 2024-11-01

## 2024-11-01 DIAGNOSIS — Z11.1 SCREENING FOR TUBERCULOSIS: Primary | ICD-10-CM

## 2024-11-09 ENCOUNTER — LAB ENCOUNTER (OUTPATIENT)
Dept: LAB | Facility: HOSPITAL | Age: 39
End: 2024-11-09
Attending: FAMILY MEDICINE
Payer: COMMERCIAL

## 2024-11-09 DIAGNOSIS — Z00.00 LABORATORY EXAMINATION ORDERED AS PART OF A ROUTINE GENERAL MEDICAL EXAMINATION: ICD-10-CM

## 2024-11-09 LAB
ALBUMIN SERPL-MCNC: 4.7 G/DL (ref 3.2–4.8)
ALBUMIN/GLOB SERPL: 2 {RATIO} (ref 1–2)
ALP LIVER SERPL-CCNC: 51 U/L
ALT SERPL-CCNC: 12 U/L
ANION GAP SERPL CALC-SCNC: 7 MMOL/L (ref 0–18)
AST SERPL-CCNC: 15 U/L (ref ?–34)
BASOPHILS # BLD AUTO: 0.03 X10(3) UL (ref 0–0.2)
BASOPHILS NFR BLD AUTO: 0.5 %
BILIRUB SERPL-MCNC: 0.5 MG/DL (ref 0.3–1.2)
BUN BLD-MCNC: 11 MG/DL (ref 9–23)
CALCIUM BLD-MCNC: 9.4 MG/DL (ref 8.7–10.4)
CHLORIDE SERPL-SCNC: 107 MMOL/L (ref 98–112)
CHOLEST SERPL-MCNC: 167 MG/DL (ref ?–200)
CO2 SERPL-SCNC: 24 MMOL/L (ref 21–32)
CREAT BLD-MCNC: 0.76 MG/DL
EGFRCR SERPLBLD CKD-EPI 2021: 103 ML/MIN/1.73M2 (ref 60–?)
EOSINOPHIL # BLD AUTO: 0.05 X10(3) UL (ref 0–0.7)
EOSINOPHIL NFR BLD AUTO: 0.8 %
ERYTHROCYTE [DISTWIDTH] IN BLOOD BY AUTOMATED COUNT: 12.4 %
FASTING PATIENT LIPID ANSWER: YES
FASTING STATUS PATIENT QL REPORTED: YES
GLOBULIN PLAS-MCNC: 2.3 G/DL (ref 2–3.5)
GLUCOSE BLD-MCNC: 82 MG/DL (ref 70–99)
HCT VFR BLD AUTO: 39.4 %
HDLC SERPL-MCNC: 50 MG/DL (ref 40–59)
HGB BLD-MCNC: 13.5 G/DL
IMM GRANULOCYTES # BLD AUTO: 0.01 X10(3) UL (ref 0–1)
IMM GRANULOCYTES NFR BLD: 0.2 %
LDLC SERPL CALC-MCNC: 106 MG/DL (ref ?–100)
LYMPHOCYTES # BLD AUTO: 1.91 X10(3) UL (ref 1–4)
LYMPHOCYTES NFR BLD AUTO: 28.8 %
MCH RBC QN AUTO: 28.8 PG (ref 26–34)
MCHC RBC AUTO-ENTMCNC: 34.3 G/DL (ref 31–37)
MCV RBC AUTO: 84.2 FL
MONOCYTES # BLD AUTO: 0.39 X10(3) UL (ref 0.1–1)
MONOCYTES NFR BLD AUTO: 5.9 %
NEUTROPHILS # BLD AUTO: 4.25 X10 (3) UL (ref 1.5–7.7)
NEUTROPHILS # BLD AUTO: 4.25 X10(3) UL (ref 1.5–7.7)
NEUTROPHILS NFR BLD AUTO: 63.8 %
NONHDLC SERPL-MCNC: 117 MG/DL (ref ?–130)
OSMOLALITY SERPL CALC.SUM OF ELEC: 284 MOSM/KG (ref 275–295)
PLATELET # BLD AUTO: 289 10(3)UL (ref 150–450)
POTASSIUM SERPL-SCNC: 4.1 MMOL/L (ref 3.5–5.1)
PROT SERPL-MCNC: 7 G/DL (ref 5.7–8.2)
RBC # BLD AUTO: 4.68 X10(6)UL
SODIUM SERPL-SCNC: 138 MMOL/L (ref 136–145)
TRIGL SERPL-MCNC: 52 MG/DL (ref 30–149)
TSI SER-ACNC: 1.41 UIU/ML (ref 0.55–4.78)
VIT D+METAB SERPL-MCNC: 26.2 NG/ML (ref 30–100)
VLDLC SERPL CALC-MCNC: 9 MG/DL (ref 0–30)
WBC # BLD AUTO: 6.6 X10(3) UL (ref 4–11)

## 2024-11-09 PROCEDURE — 86480 TB TEST CELL IMMUN MEASURE: CPT | Performed by: FAMILY MEDICINE

## 2024-11-09 PROCEDURE — 82306 VITAMIN D 25 HYDROXY: CPT

## 2024-11-09 PROCEDURE — 85025 COMPLETE CBC W/AUTO DIFF WBC: CPT | Performed by: FAMILY MEDICINE

## 2024-11-09 PROCEDURE — 36415 COLL VENOUS BLD VENIPUNCTURE: CPT | Performed by: FAMILY MEDICINE

## 2024-11-09 PROCEDURE — 84443 ASSAY THYROID STIM HORMONE: CPT | Performed by: FAMILY MEDICINE

## 2024-11-09 PROCEDURE — 80053 COMPREHEN METABOLIC PANEL: CPT | Performed by: FAMILY MEDICINE

## 2024-11-09 PROCEDURE — 80061 LIPID PANEL: CPT | Performed by: FAMILY MEDICINE

## 2024-11-11 ENCOUNTER — OFFICE VISIT (OUTPATIENT)
Age: 39
End: 2024-11-11
Payer: COMMERCIAL

## 2024-11-11 VITALS
DIASTOLIC BLOOD PRESSURE: 76 MMHG | WEIGHT: 133.81 LBS | OXYGEN SATURATION: 100 % | HEIGHT: 62.3 IN | BODY MASS INDEX: 24.32 KG/M2 | SYSTOLIC BLOOD PRESSURE: 111 MMHG | HEART RATE: 83 BPM

## 2024-11-11 DIAGNOSIS — M51.26 DISPLACEMENT OF LUMBAR INTERVERTEBRAL DISC WITHOUT MYELOPATHY: ICD-10-CM

## 2024-11-11 DIAGNOSIS — E78.2 MODERATE MIXED HYPERLIPIDEMIA NOT REQUIRING STATIN THERAPY: Primary | ICD-10-CM

## 2024-11-11 DIAGNOSIS — E66.3 OVERWEIGHT (BMI 25.0-29.9): ICD-10-CM

## 2024-11-11 DIAGNOSIS — I10 PRIMARY HYPERTENSION: ICD-10-CM

## 2024-11-11 LAB
M TB IFN-G CD4+ T-CELLS BLD-ACNC: 0 IU/ML
M TB TUBERC IFN-G BLD QL: NEGATIVE
M TB TUBERC IGNF/MITOGEN IGNF CONTROL: >10 IU/ML
QFT TB1 AG MINUS NIL: 0 IU/ML
QFT TB2 AG MINUS NIL: 0.01 IU/ML

## 2024-11-11 PROCEDURE — 99214 OFFICE O/P EST MOD 30 MIN: CPT | Performed by: INTERNAL MEDICINE

## 2024-11-11 RX ORDER — TIRZEPATIDE 15 MG/.5ML
15 INJECTION, SOLUTION SUBCUTANEOUS WEEKLY
Qty: 6 ML | Refills: 0 | Status: SHIPPED | OUTPATIENT
Start: 2024-11-11 | End: 2024-12-03

## 2024-11-11 RX ORDER — LOSARTAN POTASSIUM 25 MG/1
TABLET ORAL DAILY
COMMUNITY

## 2024-11-11 RX ORDER — TIRZEPATIDE 12.5 MG/.5ML
INJECTION, SOLUTION SUBCUTANEOUS
COMMUNITY
End: 2024-11-11

## 2024-11-11 NOTE — PROGRESS NOTES
CC:   Chief Complaint   Patient presents with    Follow - Up    Weight Management      Reason for medical consultation: Medical Management of Weight and Weight related comorbidities.      HPI:     Initial weight: 164 lbs 4/2023  Current weight: 133 lbs  Interval weight loss: 9 lbs  Total weight loss: 31 lbs    /76   Pulse 83   Ht 5' 2.3\" (1.582 m)   Wt 133 lb 12.8 oz (60.7 kg)   LMP 07/30/2024 (Approximate)   SpO2 100%   BMI 24.24 kg/m²     LABS and RESULTS:     Office Visit on 04/10/2023   Component Date Value    Glucose Urine 04/10/2023 Negative     Bilirubin Urine 04/10/2023 Negative     Ketones, UA 04/10/2023 Negative     Spec Gravity 04/10/2023 1.020     Blood Urine 04/10/2023 Negative     PH Urine 04/10/2023 5.5     Protein Urine 04/10/2023 Negative     Urobilinogen Urine 04/10/2023 0.2     Nitrite Urine 04/10/2023 Negative     Leukocyte Esterase Urine 04/10/2023 Small (A)     APPEARANCE 04/10/2023 clear     Color Urine 04/10/2023 yellow     Multistix Lot# 04/10/2023 [id number]     Multistix Expiration Date 04/10/2023 11/30/2023        Current Outpatient Medications   Medication Sig Dispense Refill    losartan 25 MG Oral Tab Take by mouth daily.      Tirzepatide-Weight Management (ZEPBOUND) 15 MG/0.5ML Subcutaneous Solution Auto-injector Inject 15 mg into the skin once a week for 4 doses. 6 mL 0    emtricitabine-tenofovir (TRUVADA) 200-300 MG Oral Tab Take 1 tablet by mouth daily. 90 tablet 1    Levonorgestrel-Ethinyl Estrad (AVIANE) 0.1-20 MG-MCG Oral Tab Take 1 tablet by mouth daily. 84 each 3    metoprolol succinate ER 25 MG Oral Tablet 24 Hr Take 1 tablet (25 mg total) by mouth. As needed        Past Medical History:    Primary hypertension         Past Surgical History:   Procedure Laterality Date    Abdominal surgery      abdominal plasty    Closed rx toe fx,manipulatn Bilateral 07/10/2015    Procedure: OPEN REDUCTION PERCUTANEOUS PINNING TOE;  Surgeon: Yogesh Mas MD;  [facility]    Fluoroscope examination Bilateral 07/10/2015    Procedure: OPEN REDUCTION PERCUTANEOUS PINNING TOE;  Surgeon: Yogesh Mas MD;  Location: [facility]    Injection, anesthetic/steroid, transforaminal epidural; lumbar/sacral, single level  01/01/2005    Other surgical history  04/2022    bilater breast reduction    Reduction left      4/2022    Reduction right         Social History:  Social History     Socioeconomic History    Marital status:    Tobacco Use    Smoking status: Never    Smokeless tobacco: Never   Vaping Use    Vaping status: Never Used   Substance and Sexual Activity    Alcohol use: No    Drug use: No     Family History:  Family History   Problem Relation Age of Onset    Lipids Father     Diabetes Father     Other (Other) Father         MI    Hypertension Father     Lipids Paternal Grandmother     Hypertension Paternal Grandmother     Diabetes Paternal Grandmother           REVIEW OF SYSTEMS:   10 point review of systems otherwise negative.  With the exception of HPI and assessment and plan        EXAM:   /76   Pulse 83   Ht 5' 2.3\" (1.582 m)   Wt 133 lb 12.8 oz (60.7 kg)   LMP 07/30/2024 (Approximate)   SpO2 100%   BMI 24.24 kg/m²   Body mass index is 24.24 kg/m².     GENERAL: NAD, conversant  SKIN: good turgor, no jaundice  HEENT: no scleral icterus,conjunctiva are clear, oropharynx clear, nl external nose and ears  NECK: supple, no carotid bruits, thyroid normal  LYMPH: no cervical LAD, no supraclavicular LAD  LUNGS: nl effort, clear to auscultation bilaterally  CARDIO: RRR, no murmur  ABDOMEN: NT/ND, no masses, no HSM   EXTREMITIES: gait normal, no edema   PSYCH: Oriented times three, appropriate affect        ASSESSMENT AND PLAN:     Nutritional Counseling: educate, track, dietician    1. Moderate mixed hyperlipidemia not requiring statin therapy  2. Overweight (BMI 25.0-29.9)  3. Primary Hypertension  4. Vitamin D deficiency    -Initial BMI 29.71, weight  164 lbs  -patient failed structured diet and exercise program>6months  -labs 2023 elevated , ALT 50  -Hx of occasional tachycardia, no hx of CAD  -EKG and stress test 2022 wnl  -phentermine contraindicated due to elevation in BP  -Topiramate caused decrease energy and brain fog  - Contraindication to Qsymia and Contrave, Diethylpropion due to elevation of blood pressure  -Wegovy is out of stock right now  -Metformin tried, no weight loss  -Echo done 12/2022; reviewed by cardiology, per patient EF normal  -labs done 8/2023 with increased neutrophils, normal TSH, CBC, lft, low vit D and elevated cholesterol  -patient is interested in GLP1 medications, patient denies any personal or family history of MTC or in patients with Multiple Endocrine Neoplasia syndrome type 2 (MEN 2). Counseled patient regarding the potential risk for MTC with the use of semaglutide and inform them of symptoms of thyroid tumors (eg, a mass in the neck, dysphagia, dyspnea, persistent hoarseness).      Plan:  - Zepbound 12.5 mg weekly, 2 refill  - losartan 25 mg  - Discussed Kcal goals <1800 Kcal, carbohydrate goal <100 grams  - Exercise goal 1 hour daily  - Stress management 10 minutes meditation before bedtime  - Drink at least 64 oz water daily      Regarding Obesity:  Consulted on nutrition and activity  Advised portion-controlled/low carb diet <100 g daily.  Discussed sleep and stress management.    Regarding weight loss medications.  I've discussed with patients the risks and benefits of such weight loss medications.  That these are treatments to be used in conjunction with diet and exercise for promoting health and weight loss.       Return in about 2 months (around 1/11/2025).     Erin Holder MD

## 2024-12-16 ENCOUNTER — TELEMEDICINE (OUTPATIENT)
Age: 39
End: 2024-12-16
Payer: COMMERCIAL

## 2024-12-16 DIAGNOSIS — E66.3 OVERWEIGHT (BMI 25.0-29.9): ICD-10-CM

## 2024-12-16 DIAGNOSIS — F43.9 STRESS: ICD-10-CM

## 2024-12-16 DIAGNOSIS — E78.2 MODERATE MIXED HYPERLIPIDEMIA NOT REQUIRING STATIN THERAPY: Primary | ICD-10-CM

## 2024-12-16 DIAGNOSIS — I10 PRIMARY HYPERTENSION: ICD-10-CM

## 2024-12-16 RX ORDER — LEVONORGESTREL AND ETHINYL ESTRADIOL 0.1-0.02MG
1 KIT ORAL DAILY
Qty: 28 TABLET | Refills: 0 | Status: SHIPPED | OUTPATIENT
Start: 2024-12-16

## 2024-12-16 RX ORDER — TIRZEPATIDE 15 MG/.5ML
15 INJECTION, SOLUTION SUBCUTANEOUS WEEKLY
Qty: 6 ML | Refills: 0 | Status: SHIPPED | OUTPATIENT
Start: 2024-12-16 | End: 2025-03-04

## 2024-12-16 RX ORDER — EMTRICITABINE AND TENOFOVIR DISOPROXIL FUMARATE 200; 300 MG/1; MG/1
1 TABLET, FILM COATED ORAL DAILY
Qty: 90 TABLET | Refills: 0 | Status: SHIPPED | OUTPATIENT
Start: 2024-12-16

## 2024-12-16 NOTE — PROGRESS NOTES
Patient ID: Tanisha Mack is a 39 year old female.  No chief complaint on file.         HISTORY OF PRESENT ILLNESS:   Patient presents for above.  This visit is conducted using Telemedicine with live, interactive video and audio.    Initial weight: 164 lbs 4/2023  Current weight: 131 lbs  Interval weight loss: 2 lbs  Total weight loss: 33 lbs    Review of Systems   All other systems reviewed and are negative.     MEDICAL HISTORY:     Past Medical History:    Primary hypertension       Past Surgical History:   Procedure Laterality Date    Abdominal surgery      abdominal plasty    Closed rx toe fx,manipulatn Bilateral 07/10/2015    Procedure: OPEN REDUCTION PERCUTANEOUS PINNING TOE;  Surgeon: Yogesh Mas MD;  Location: [facility]    Fluoroscope examination Bilateral 07/10/2015    Procedure: OPEN REDUCTION PERCUTANEOUS PINNING TOE;  Surgeon: Yogesh Mas MD;  [facility]    Injection, anesthetic/steroid, transforaminal epidural; lumbar/sacral, single level  01/01/2005    Other surgical history  04/2022    bilater breast reduction    Reduction left      4/2022    Reduction right           Current Outpatient Medications:     Levonorgestrel-Ethinyl Estrad (AVIANE) 0.1-20 MG-MCG Oral Tab, take 1 tablet BY MOUTH EVERY DAY, Disp: 28 tablet, Rfl: 0    losartan 25 MG Oral Tab, Take by mouth daily., Disp: , Rfl:     emtricitabine-tenofovir (TRUVADA) 200-300 MG Oral Tab, Take 1 tablet by mouth daily., Disp: 90 tablet, Rfl: 1    metoprolol succinate ER 25 MG Oral Tablet 24 Hr, Take 1 tablet (25 mg total) by mouth. As needed, Disp: , Rfl:     Allergies:Allergies[1]    Social History     Socioeconomic History    Marital status:      Spouse name: Not on file    Number of children: Not on file    Years of education: Not on file    Highest education level: Not on file   Occupational History    Not on file   Tobacco Use    Smoking status: Never    Smokeless tobacco: Never   Vaping Use     Vaping status: Never Used   Substance and Sexual Activity    Alcohol use: No    Drug use: No    Sexual activity: Not on file   Other Topics Concern    Not on file   Social History Narrative    Not on file     Social Drivers of Health     Financial Resource Strain: Not on file   Food Insecurity: Not on file   Transportation Needs: Not on file   Physical Activity: Not on file   Stress: Not on file   Social Connections: Not on file   Housing Stability: Not on file       PHYSICAL EXAM:   Unable to perform vitals or do physical exam as this is a virtual video visit.  Patient appears alert.  No conversational dyspnea or distress.    ASSESSMENT/PLAN:   1. Moderate mixed hyperlipidemia not requiring statin therapy  2. Overweight (BMI 25.0-29.9)  3. Primary Hypertension  4. Vitamin D deficiency     -Initial BMI 29.71, weight 164 lbs  -patient failed structured diet and exercise program>6months  -labs 2023 elevated , ALT 50  -Hx of occasional tachycardia, no hx of CAD  -EKG and stress test 2022 wnl  -phentermine contraindicated due to elevation in BP  -Topiramate caused decrease energy and brain fog  - Contraindication to Qsymia and Contrave, Diethylpropion due to elevation of blood pressure  -Wegovy is out of stock right now  -Metformin tried, no weight loss  -Echo done 12/2022; reviewed by cardiology, per patient EF normal  -labs done 8/2023 with increased neutrophils, normal TSH, CBC, lft, low vit D and elevated cholesterol  -patient is interested in GLP1 medications, patient denies any personal or family history of MTC or in patients with Multiple Endocrine Neoplasia syndrome type 2 (MEN 2). Counseled patient regarding the potential risk for MTC with the use of semaglutide and inform them of symptoms of thyroid tumors (eg, a mass in the neck, dysphagia, dyspnea, persistent hoarseness).      Plan:  - Zepbound 15 mg weekly, 3 months supply  - losartan 25 mg  -referral to [facility] for stress/anxiety  - Discussed  Kcal goals <1800 Kcal, carbohydrate goal <100 grams  - Exercise goal 1 hour daily  - Stress management 10 minutes meditation before bedtime  - Drink at least 64 oz water daily        Regarding Obesity:  Consulted on nutrition and activity  Advised portion-controlled/low carb diet <100 g daily.  Discussed sleep and stress management.     Regarding weight loss medications.  I've discussed with patients the risks and benefits of such weight loss medications.  That these are treatments to be used in conjunction with diet and exercise for promoting health and weight loss.     No follow-ups on file.    Time spent on encounter  20 minutes   Video time 10 minutes   Documentation time 10 minutes     Tanisha Mack understands video evaluation is not a substitute for face-to-face examination or emergency care. Patient advised to go to ER or call 911 for worsening symptoms or acute distress.     [facility]   I conducted a telehealth visit with Tanisha Mack today, 12/16/24, which was completed using two-way, real-time interactive audio and video communication. This has been done in good davey to provide continuity of care in the best interest of the provider-patient relationship, due to the COVID -19 public health crisis/national emergency where restrictions of face-to-face office visits are ongoing. Every conscious effort was taken to allow for sufficient and adequate time to complete the visit.  The patient was made aware of the limitations of the telehealth visit, including treatment limitations as no physical exam could be performed.  The patient was advised to call 911 or to go to the ER in case there was an emergency.  The patient was also advised of the potential privacy & security concerns related to the telehealth platform.   The patient was made aware of where to find [facility]'s notice of privacy practices, telehealth consent form and other related consent  forms and documents.  which are located on the [facility] website. The patient verbally agreed to telehealth consent form, related consents and the risks discussed.    Lastly, the patient confirmed that they were in [place].   Included in this visit, time may have been spent reviewing labs, medications, radiology tests and decision making. Appropriate medical decision-making and tests are ordered as detailed in the plan of care above.  Coding/billing information is submitted for this visit based on complexity of care and/or time spent for the visit.    This note was prepared using Dragon Medical voice recognition dictation software. As a result errors may occur. When identified these errors have been corrected. While every attempt is made to correct errors during dictation discrepancies may still exist.    Erin Holder MD         [1] No Known Allergies

## 2024-12-17 ENCOUNTER — TELEPHONE (OUTPATIENT)
Age: 39
End: 2024-12-17

## 2024-12-17 NOTE — TELEPHONE ENCOUNTER
Hello - I am reaching out from the [facility] Navigation department, following up on an order from your provider's office to assist in connecting you with resources for care. If you would like to discuss this further, please give us a call at [phone], or for more immediate assistance you can contact our 24-hour help line at [phone]. We look forward to hearing from you soon.

## 2024-12-27 ENCOUNTER — TELEPHONE (OUTPATIENT)
Age: 39
End: 2024-12-27

## 2024-12-27 NOTE — TELEPHONE ENCOUNTER
Hello - I am reaching out from the [facility] Navigation department, following up on an order from your provider's office to assist in connecting you with resources for care. If you would like to discuss this further, please give us a call at [phone], or for more immediate assistance you can contact our 24-hour help line at [phone]. We look forward to hearing from you soon.

## 2025-01-01 ENCOUNTER — PATIENT MESSAGE (OUTPATIENT)
Facility: CLINIC | Age: 40
End: 2025-01-01

## 2025-01-02 RX ORDER — LEVONORGESTREL AND ETHINYL ESTRADIOL 0.1-0.02MG
1 KIT ORAL DAILY
Qty: 28 TABLET | Refills: 0 | OUTPATIENT
Start: 2025-01-02

## 2025-01-02 RX ORDER — LEVONORGESTREL/ETHIN.ESTRADIOL 0.1-0.02MG
1 TABLET ORAL DAILY
Qty: 84 TABLET | Refills: 0 | Status: SHIPPED | OUTPATIENT
Start: 2025-01-02 | End: 2025-02-11

## 2025-02-06 ENCOUNTER — TELEPHONE (OUTPATIENT)
Dept: SURGERY | Facility: CLINIC | Age: 40
End: 2025-02-06

## 2025-02-11 RX ORDER — LOSARTAN POTASSIUM 25 MG/1
25 TABLET ORAL DAILY
Qty: 90 TABLET | Refills: 0 | Status: SHIPPED | OUTPATIENT
Start: 2025-02-11

## 2025-02-11 RX ORDER — LEVONORGESTREL AND ETHINYL ESTRADIOL 0.1-0.02MG
1 KIT ORAL DAILY
Qty: 84 TABLET | Refills: 0 | Status: SHIPPED | OUTPATIENT
Start: 2025-02-11

## 2025-04-21 ENCOUNTER — LAB ENCOUNTER (OUTPATIENT)
Dept: LAB | Age: 40
End: 2025-04-21
Attending: FAMILY MEDICINE
Payer: COMMERCIAL

## 2025-04-21 ENCOUNTER — OFFICE VISIT (OUTPATIENT)
Dept: FAMILY MEDICINE CLINIC | Facility: CLINIC | Age: 40
End: 2025-04-21
Payer: COMMERCIAL

## 2025-04-21 VITALS
BODY MASS INDEX: 23.26 KG/M2 | HEIGHT: 62.3 IN | WEIGHT: 128 LBS | SYSTOLIC BLOOD PRESSURE: 112 MMHG | HEART RATE: 100 BPM | RESPIRATION RATE: 20 BRPM | DIASTOLIC BLOOD PRESSURE: 70 MMHG | OXYGEN SATURATION: 99 %

## 2025-04-21 DIAGNOSIS — Z13.228 SCREENING FOR ENDOCRINE, NUTRITIONAL, METABOLIC AND IMMUNITY DISORDER: ICD-10-CM

## 2025-04-21 DIAGNOSIS — Z13.0 SCREENING FOR ENDOCRINE, NUTRITIONAL, METABOLIC AND IMMUNITY DISORDER: ICD-10-CM

## 2025-04-21 DIAGNOSIS — Z80.0 FAMILY HISTORY OF COLON CANCER: ICD-10-CM

## 2025-04-21 DIAGNOSIS — J34.89 NASAL CRUSTING: ICD-10-CM

## 2025-04-21 DIAGNOSIS — Z13.29 SCREENING FOR ENDOCRINE, NUTRITIONAL, METABOLIC AND IMMUNITY DISORDER: ICD-10-CM

## 2025-04-21 DIAGNOSIS — Z13.21 SCREENING FOR ENDOCRINE, NUTRITIONAL, METABOLIC AND IMMUNITY DISORDER: ICD-10-CM

## 2025-04-21 DIAGNOSIS — R53.83 OTHER FATIGUE: ICD-10-CM

## 2025-04-21 DIAGNOSIS — K59.09 OTHER CONSTIPATION: ICD-10-CM

## 2025-04-21 DIAGNOSIS — Z00.00 ROUTINE GENERAL MEDICAL EXAMINATION AT A HEALTH CARE FACILITY: Primary | ICD-10-CM

## 2025-04-21 LAB
ALBUMIN SERPL-MCNC: 4.5 G/DL (ref 3.2–4.8)
ALBUMIN/GLOB SERPL: 1.9 {RATIO} (ref 1–2)
ALP LIVER SERPL-CCNC: 47 U/L (ref 37–98)
ALT SERPL-CCNC: 14 U/L (ref 10–49)
ANION GAP SERPL CALC-SCNC: 9 MMOL/L (ref 0–18)
AST SERPL-CCNC: 19 U/L (ref ?–34)
BASOPHILS # BLD AUTO: 0.02 X10(3) UL (ref 0–0.2)
BASOPHILS NFR BLD AUTO: 0.2 %
BILIRUB SERPL-MCNC: 0.6 MG/DL (ref 0.3–1.2)
BUN BLD-MCNC: 12 MG/DL (ref 9–23)
CALCIUM BLD-MCNC: 9.6 MG/DL (ref 8.7–10.6)
CHLORIDE SERPL-SCNC: 106 MMOL/L (ref 98–112)
CO2 SERPL-SCNC: 24 MMOL/L (ref 21–32)
CREAT BLD-MCNC: 0.91 MG/DL (ref 0.55–1.02)
EGFRCR SERPLBLD CKD-EPI 2021: 82 ML/MIN/1.73M2 (ref 60–?)
EOSINOPHIL # BLD AUTO: 0.03 X10(3) UL (ref 0–0.7)
EOSINOPHIL NFR BLD AUTO: 0.4 %
ERYTHROCYTE [DISTWIDTH] IN BLOOD BY AUTOMATED COUNT: 11.9 %
FASTING STATUS PATIENT QL REPORTED: NO
GLOBULIN PLAS-MCNC: 2.4 G/DL (ref 2–3.5)
GLUCOSE BLD-MCNC: 80 MG/DL (ref 70–99)
HCT VFR BLD AUTO: 38 % (ref 35–48)
HGB BLD-MCNC: 13 G/DL (ref 12–16)
IMM GRANULOCYTES # BLD AUTO: 0.02 X10(3) UL (ref 0–1)
IMM GRANULOCYTES NFR BLD: 0.2 %
LYMPHOCYTES # BLD AUTO: 1.68 X10(3) UL (ref 1–4)
LYMPHOCYTES NFR BLD AUTO: 20.8 %
MCH RBC QN AUTO: 29.5 PG (ref 26–34)
MCHC RBC AUTO-ENTMCNC: 34.2 G/DL (ref 31–37)
MCV RBC AUTO: 86.2 FL (ref 80–100)
MONOCYTES # BLD AUTO: 0.34 X10(3) UL (ref 0.1–1)
MONOCYTES NFR BLD AUTO: 4.2 %
NEUTROPHILS # BLD AUTO: 6 X10 (3) UL (ref 1.5–7.7)
NEUTROPHILS # BLD AUTO: 6 X10(3) UL (ref 1.5–7.7)
NEUTROPHILS NFR BLD AUTO: 74.2 %
OSMOLALITY SERPL CALC.SUM OF ELEC: 287 MOSM/KG (ref 275–295)
PLATELET # BLD AUTO: 263 10(3)UL (ref 150–450)
POTASSIUM SERPL-SCNC: 4.2 MMOL/L (ref 3.5–5.1)
PROT SERPL-MCNC: 6.9 G/DL (ref 5.7–8.2)
RBC # BLD AUTO: 4.41 X10(6)UL (ref 3.8–5.3)
SODIUM SERPL-SCNC: 139 MMOL/L (ref 136–145)
T4 FREE SERPL-MCNC: 1.3 NG/DL (ref 0.8–1.7)
THYROPEROXIDASE AB SERPL-ACNC: <28 U/ML (ref ?–60)
TSI SER-ACNC: 0.66 UIU/ML (ref 0.55–4.78)
VIT B12 SERPL-MCNC: 402 PG/ML (ref 211–911)
VIT D+METAB SERPL-MCNC: 27.1 NG/ML (ref 30–100)
WBC # BLD AUTO: 8.1 X10(3) UL (ref 4–11)

## 2025-04-21 PROCEDURE — 85025 COMPLETE CBC W/AUTO DIFF WBC: CPT

## 2025-04-21 PROCEDURE — 36415 COLL VENOUS BLD VENIPUNCTURE: CPT | Performed by: FAMILY MEDICINE

## 2025-04-21 PROCEDURE — 86376 MICROSOMAL ANTIBODY EACH: CPT | Performed by: FAMILY MEDICINE

## 2025-04-21 PROCEDURE — 84439 ASSAY OF FREE THYROXINE: CPT | Performed by: FAMILY MEDICINE

## 2025-04-21 PROCEDURE — 84443 ASSAY THYROID STIM HORMONE: CPT

## 2025-04-21 PROCEDURE — 80053 COMPREHEN METABOLIC PANEL: CPT

## 2025-04-21 PROCEDURE — 82306 VITAMIN D 25 HYDROXY: CPT

## 2025-04-21 PROCEDURE — 87081 CULTURE SCREEN ONLY: CPT

## 2025-04-21 PROCEDURE — 87077 CULTURE AEROBIC IDENTIFY: CPT

## 2025-04-21 PROCEDURE — 99395 PREV VISIT EST AGE 18-39: CPT | Performed by: FAMILY MEDICINE

## 2025-04-21 PROCEDURE — 82607 VITAMIN B-12: CPT

## 2025-04-21 RX ORDER — LOSARTAN POTASSIUM 25 MG/1
25 TABLET ORAL DAILY
Qty: 90 TABLET | Refills: 2 | Status: SHIPPED | OUTPATIENT
Start: 2025-04-21

## 2025-04-21 RX ORDER — LEVONORGESTREL/ETHIN.ESTRADIOL 0.1-0.02MG
1 TABLET ORAL DAILY
Qty: 84 TABLET | Refills: 3 | Status: SHIPPED | OUTPATIENT
Start: 2025-04-21

## 2025-04-21 NOTE — PROGRESS NOTES
Tanisha Mack is a 40 year old female who presents for a complete physical exam, no gyn.  HPI:     Chief Complaint   Patient presents with    Well Adult     Reviewed Preventative/Wellness form with patient.        Patient feels well, dental visit up to date, no hearing problem.  Vaccinations up to date.  Feeling tired, constipated.  Exercise: three times per week.  Diet: watches calories closely    Wt Readings from Last 3 Encounters:   04/21/25 128 lb (58.1 kg)   11/11/24 133 lb 12.8 oz (60.7 kg)   08/12/24 142 lb 6.4 oz (64.6 kg)      BP Readings from Last 3 Encounters:   04/21/25 112/70   11/11/24 111/76   08/12/24 116/80     Patient's last menstrual period was 04/09/2025 (approximate).         Current Medications[1]   Past Medical History[2]   Past Surgical History[3]   Family History[4]   Short Social Hx on File[5]     REVIEW OF SYSTEMS:   GENERAL HEALTH: feels well otherwise.  SKIN: denies any unusual skin lesions or rashes  EYES: no visual complaints or deficits  HEENT: denies nasal congestion, sinus pain or sore throat; hearing loss negative   RESPIRATORY: denies shortness of breath, wheezing or cough   CARDIOVASCULAR: denies chest pain, SOB, edema,orthopnea, no palpitations   GI: denies nausea, vomiting, constipation, diarrhea; no rectal bleeding; no heartburn  GENITAL/: no dysuria, urgency or frequency  MUSCULOSKELETAL: no joint complaints upper or lower extremities  NEURO: no sensory or motor complaint  HEMATOLOGY: denies hx anemia; denies bruising or excessive bleeding  ENDOCRINE: denies excessive thirst or urination; denies unexpected wt gain or wt loss  ALLERGY/IMM.: denies food or seasonal allergies  PSYCH: no symptoms of depression or anxiety      EXAM:   /70   Pulse 100   Resp 20   Ht 5' 2.3\" (1.582 m)   Wt 128 lb (58.1 kg)   LMP 04/09/2025 (Approximate)   SpO2 99%   BMI 23.19 kg/m²      General: WD/WN in no acute distress.   HEENT: PERRLA and EOMI.  OP moist no lesions.  Nose: sore in the left nostril. Neck is supple, with no cervical LAD or thyroid abnormalities. No carotid bruits.    Lungs: are clear to auscultation bilaterally, with no wheeze, rhonchi, or rales.   Heart: is RRR.  S1, S2, with no murmurs,clicks, gallops  Abdomen: is soft,NBS, NT/ND with no HSM.  No rebound or guarding. No CVA tenderness, no hernias.  Neuro: Cranial nerves II-XII normal,no focal abnormalities, and reflexes coordination and gait normal and symmetric.Sensation intact.  Extremities: are symmetric with no cyanosis, clubbing, or edema.  MS: Normal muscles tones, no joints abnormalities.  SKIN: Normal color, turgor, no lesions, rashes or wounds.  PSYCH: Normal affect and mood.          ASSESSMENT AND PLAN:     Tanisha Mack is a 40 year old female who presents for a complete physical exam.   Pt's was recommended low fat diet and aerobic exercise 30 minutes three times weekly.     Family history of colon cancer  -     Occult Blood, Fecal, Immunoassay (Blue cards) [E]; Future    Other constipation  -     CBC With Differential With Platelet; Future  -     Comp Metabolic Panel (14); Future    Other fatigue  -     CBC With Differential With Platelet; Future  -     Comp Metabolic Panel (14); Future  -     Vitamin B12; Future  -     Thyroid Peroxidase (TPO) AB  -     Free T4, (Free Thyroxine)    Screening for endocrine, nutritional, metabolic and immunity disorder  -     Comp Metabolic Panel (14); Future  -     Assay, Thyroid Stim Hormone; Future  -     Vitamin D; Future  -     Vitamin B12; Future  -     Thyroid Peroxidase (TPO) AB  -     Free T4, (Free Thyroxine)    Nasal crusting  -     MSSA and MRSA Culture Screen; Future    HTN:  -     losartan 25 MG Oral Tab; Take 1 tablet (25 mg total) by mouth daily.        The patient indicates understanding of these issues and agrees to the plan.  The patient is asked to return for CPX in 1 year.          [1]   Current Outpatient Medications   Medication Sig  Dispense Refill    losartan 25 MG Oral Tab Take 1 tablet (25 mg total) by mouth daily. 90 tablet 2    LOSARTAN 25 MG Oral Tab take 1 tablet (25MG TOTAL) BY MOUTH DAILY 90 tablet 0    AVIANE 0.1-20 MG-MCG Oral Tab Take 1 tablet by mouth daily. 84 tablet 0    EMTRICITABINE-TENOFOVIR 200-300 MG Oral Tab take 1 tablet BY MOUTH EVERY DAY 90 tablet 0    metoprolol succinate ER 25 MG Oral Tablet 24 Hr Take 1 tablet (25 mg total) by mouth. As needed      CUSTOM MEDICATION Semaglutide/cyanocobalamin injection    Concentration: 5 mg/ 0.5 mg / 1 mL   Amount: 5 ML (2 x 2.5 mL vials) with supplies  Instructions: Inject 20 mL weekly subcutaneous 1 each 0   [2]   Past Medical History:   Primary hypertension   [3]   Past Surgical History:  Procedure Laterality Date    Abdominal surgery      abdominal plasty    Closed rx toe fx,manipulatn Bilateral 07/10/2015    Procedure: OPEN REDUCTION PERCUTANEOUS PINNING TOE;  Surgeon: Yogesh Mas MD;  Location: [facility]    Fluoroscope examination Bilateral 07/10/2015    Procedure: OPEN REDUCTION PERCUTANEOUS PINNING TOE;  Surgeon: Yogesh Mas MD;  Location: [facility]    Injection, anesthetic/steroid, transforaminal epidural; lumbar/sacral, single level  01/01/2005    Other surgical history  04/2022    bilater breast reduction    Reduction left      4/2022    Reduction right     [4]   Family History  Problem Relation Age of Onset    Lipids Father     Diabetes Father     Other (Other) Father         MI    Hypertension Father     Lipids Paternal Grandmother     Hypertension Paternal Grandmother     Diabetes Paternal Grandmother    [5]   Social History  Socioeconomic History    Marital status:    Tobacco Use    Smoking status: Never    Smokeless tobacco: Never   Vaping Use    Vaping status: Never Used   Substance and Sexual Activity    Alcohol use: No    Drug use: No     Social Drivers of Health     Food Insecurity: No Food Insecurity (4/21/2025)    NCSS - Food  Insecurity     Worried About Running Out of Food in the Last Year: No     Ran Out of Food in the Last Year: No   Transportation Needs: No Transportation Needs (4/21/2025)    NCSS - Transportation     Lack of Transportation: No   Housing Stability: At Risk (4/21/2025)    NCSS - Housing/Utilities     Has Housing: No     Worried About Losing Housing: No     Unable to Get Utilities: No

## 2025-04-23 ENCOUNTER — TELEPHONE (OUTPATIENT)
Dept: FAMILY MEDICINE CLINIC | Facility: CLINIC | Age: 40
End: 2025-04-23

## 2025-04-23 ENCOUNTER — PATIENT MESSAGE (OUTPATIENT)
Dept: FAMILY MEDICINE CLINIC | Facility: CLINIC | Age: 40
End: 2025-04-23

## 2025-04-23 DIAGNOSIS — Z22.322 MRSA (METHICILLIN RESISTANT STAPH AUREUS) CULTURE POSITIVE: Primary | ICD-10-CM

## 2025-04-23 DIAGNOSIS — J34.89 NASAL CRUSTING: ICD-10-CM

## 2025-04-23 RX ORDER — MUPIROCIN CALCIUM 20 MG/G
CREAM TOPICAL
Qty: 15 G | Refills: 0 | Status: CANCELLED | OUTPATIENT
Start: 2025-04-23

## 2025-04-23 RX ORDER — LOSARTAN POTASSIUM 25 MG/1
25 TABLET ORAL DAILY
Qty: 90 TABLET | Refills: 2 | Status: SHIPPED | OUTPATIENT
Start: 2025-04-23

## 2025-04-23 RX ORDER — LEVONORGESTREL/ETHIN.ESTRADIOL 0.1-0.02MG
1 TABLET ORAL DAILY
Qty: 84 TABLET | Refills: 3 | Status: SHIPPED | OUTPATIENT
Start: 2025-04-23

## 2025-04-23 NOTE — TELEPHONE ENCOUNTER
Pt called. Stated she is looking for follow up on this. Notified refills sent in. Notified will place orders shortly for cream and repeat testing. Pt stated she is only near [place] now and wants to get today.     Dr. CLEANING - Please review pended orders and sign if agreeable, unsure if cream is pended correctly. Need to know how much to apply TID

## 2025-04-23 NOTE — TELEPHONE ENCOUNTER
Ok to send all the meds she requested,    Ok to send Bactroban ointment TID, one tube to the pharmacy. Another MRSA (same I ordered recently) in two weeks. Thanks.

## 2025-04-23 NOTE — TELEPHONE ENCOUNTER
Called patient with other test results. Patient states MRSA results just finalized. Asking for a prescription.    Patient also requesting Losartan and OCP refills be send to [facility] as she cannot use [facility] any more with insurance. Updated refills to correct pharmacy.    Dr Collins - culture positive for Staph aureus. Patient requesting antibiotic.

## 2025-04-24 RX ORDER — MUPIROCIN 20 MG/G
1 OINTMENT TOPICAL 3 TIMES DAILY
Qty: 30 G | Refills: 0 | Status: SHIPPED | OUTPATIENT
Start: 2025-04-24 | End: 2025-04-29

## 2025-04-24 NOTE — TELEPHONE ENCOUNTER
Pt sent MCM yesterday. Wants rx sent to [facility].       All Conversations: Prescription for new medication  (Newest Message First)             Tanisha Mack to [facility] Staff (supporting Gunjan Collins MD)        4/23/25  8:54 PM  Hi, Dr. Collins was sending a new prescription yesterday but the [place] pharmacy never received it. Can this prescription be sent to the [facility] pharmacy instead now? It is the [facility] pharmacy that is listed in my chart in [facility]. Thank you.

## (undated) DIAGNOSIS — R92.8 ABNORMAL MAMMOGRAM OF LEFT BREAST: Primary | ICD-10-CM

## (undated) DIAGNOSIS — Z01.84 IMMUNITY STATUS TESTING: Primary | ICD-10-CM

## (undated) DIAGNOSIS — Z00.00 ROUTINE GENERAL MEDICAL EXAMINATION AT A HEALTH CARE FACILITY: Primary | ICD-10-CM

## (undated) NOTE — LETTER
10/29/18        [address]      Dear Maximiliano Dueñas,    [address] [facility] records indicate that you have outstanding lab work and or testing that was ordered for you and has not yet been completed:  Orders Placed This En